# Patient Record
Sex: MALE | Race: OTHER | HISPANIC OR LATINO
[De-identification: names, ages, dates, MRNs, and addresses within clinical notes are randomized per-mention and may not be internally consistent; named-entity substitution may affect disease eponyms.]

---

## 2023-04-18 ENCOUNTER — APPOINTMENT (OUTPATIENT)
Dept: ORTHOPEDIC SURGERY | Facility: CLINIC | Age: 43
End: 2023-04-18

## 2023-04-18 PROBLEM — Z00.00 ENCOUNTER FOR PREVENTIVE HEALTH EXAMINATION: Status: ACTIVE | Noted: 2023-04-18

## 2023-04-19 ENCOUNTER — APPOINTMENT (OUTPATIENT)
Dept: ORTHOPEDIC SURGERY | Facility: CLINIC | Age: 43
End: 2023-04-19
Payer: OTHER MISCELLANEOUS

## 2023-04-19 VITALS — BODY MASS INDEX: 31.7 KG/M2 | HEIGHT: 69 IN | WEIGHT: 214 LBS

## 2023-04-19 DIAGNOSIS — S33.5XXA SPRAIN OF LIGAMENTS OF LUMBAR SPINE, INITIAL ENCOUNTER: ICD-10-CM

## 2023-04-19 DIAGNOSIS — M54.16 RADICULOPATHY, LUMBAR REGION: ICD-10-CM

## 2023-04-19 PROCEDURE — 72100 X-RAY EXAM L-S SPINE 2/3 VWS: CPT

## 2023-04-19 PROCEDURE — 72170 X-RAY EXAM OF PELVIS: CPT

## 2023-04-19 NOTE — PHYSICAL EXAM
[] : antalgic [de-identified] : He has 4/5 weakness left against dorsiflexion compared to the right [de-identified] : left leg radicular complaints

## 2023-04-19 NOTE — ASSESSMENT
[FreeTextEntry1] : start on medrol robaxin then diclofenac after medrol. \par start therapy and f/u one week with spine specialist Dr Mas. If radicular pain persist consider mri r/o hnp\par he would like to continue working, should try to take breaks and not do any lifting if possible

## 2023-04-19 NOTE — WORK
[Sprain/Strain] : sprain/strain [Was the competent medical cause of the injury] : was the competent medical cause of the injury [Are consistent with the injury] : are consistent with the injury [Consistent with my objective findings] : consistent with my objective findings [Mild Partial] : mild partial [Does not reveal pre-existing condition(s) that may affect treatment/prognosis] : does not reveal pre-existing condition(s) that may affect treatment/prognosis [Can return to work without limitations on ______] : can return to work without limitations on [unfilled] [Lifting] : lifting [Pulling/Pushing] : pulling/pushing [Unknown at this time] : : unknown at this time [Patient] : patient [No Rx restrictions] : No Rx restrictions. [I provided the services listed above] :  I provided the services listed above. [Medium Work:] : Medium Work: Exerting 20 to 50 pounds of force occasionally, and/or 10 to 25 pounds of force frequently, and/or greater than negligible up to 10 pounds of force constantly to move objects. Physical demand requirements are in excess of those for Light Work

## 2023-04-19 NOTE — HISTORY OF PRESENT ILLNESS
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 He states while at work on 4/14/23 he lifted up a heavy case and developed pain and limited range of motion in the lower back. He has been ambulating with a limp. He has noted pain posterior left leg.\par He has continued to work.  [FreeTextEntry5] : pt injured lower back 04/17/23 pt states he picked up a crate milk

## 2023-04-19 NOTE — IMAGING
[de-identified] : normal [Straightening consistent with spasm] : Straightening consistent with spasm

## 2023-04-28 ENCOUNTER — FORM ENCOUNTER (OUTPATIENT)
Age: 43
End: 2023-04-28

## 2023-04-28 ENCOUNTER — APPOINTMENT (OUTPATIENT)
Dept: ORTHOPEDIC SURGERY | Facility: CLINIC | Age: 43
End: 2023-04-28
Payer: OTHER MISCELLANEOUS

## 2023-04-28 VITALS — BODY MASS INDEX: 31.7 KG/M2 | HEIGHT: 69 IN | WEIGHT: 214 LBS

## 2023-04-28 DIAGNOSIS — M47.816 SPONDYLOSIS W/OUT MYELOPATHY OR RADICULOPATHY, LUMBAR REGION: ICD-10-CM

## 2023-04-28 PROCEDURE — 99204 OFFICE O/P NEW MOD 45 MIN: CPT

## 2023-04-28 NOTE — HISTORY OF PRESENT ILLNESS
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 Patient reports on 4/19/23, pain started after heavy lifting (crate of milk,3-40lbs) felt strain.\par \par  Was seen at [facility] on 4/19/23, had x-rays, Tx with Medrol, diclofenac, PT. \par \par Since injury, patient was on Diclofenac and completed a MDP after coming to [facility] on 4/19/23. Patient reports improvement with MDP. Patient has performed 1 session of PT since injury. \par \par No previous spine issues, no hx of spine surgery, No hx of MIRA\par No recent MRI \par No use of cane or brace \par \par PmHx: Asthma\par No DM, No CA\par \par Xray lumbar spine completed 4/19/23\par L spine - negative \par \par working full duty  [] : no [FreeTextEntry1] : Lower back  [FreeTextEntry3] : 4/17/2023 [FreeTextEntry5] : 44 yo M here for lower back eval. 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 He states while at work on 4/14/23 he lifted up a heavy case and developed pain and limited range of motion in the lower back. Was seen at [facility] on 4/19/23, had x-rays, Tx with Medrol, diclofenac, PT.  [FreeTextEntry6] : numbness [FreeTextEntry7] : both legs  [de-identified] : motion [de-identified] : 4/19/23 [de-identified] : Arturo Frank RPA [de-identified] : 4/26/2023 [de-identified] : Xrays at [facility] [de-identified] : dietary rehab home

## 2023-04-28 NOTE — PHYSICAL EXAM
[Flexion] : flexion [Extension] : extension [Bending to left] : bending to left [Bending to right] : bending to right [] : no SI joint tenderness

## 2023-04-28 NOTE — DISCUSSION/SUMMARY
[de-identified] : reviewed the case and the imaging \par indicated for MRi l spine \par cont with PT \par fu review the MRI \par working with pain

## 2023-04-29 ENCOUNTER — APPOINTMENT (OUTPATIENT)
Dept: MRI IMAGING | Facility: CLINIC | Age: 43
End: 2023-04-29
Payer: OTHER MISCELLANEOUS

## 2023-04-29 PROCEDURE — 72148 MRI LUMBAR SPINE W/O DYE: CPT

## 2023-05-19 ENCOUNTER — APPOINTMENT (OUTPATIENT)
Dept: ORTHOPEDIC SURGERY | Facility: CLINIC | Age: 43
End: 2023-05-19
Payer: OTHER MISCELLANEOUS

## 2023-05-19 DIAGNOSIS — M51.26 OTHER INTERVERTEBRAL DISC DISPLACEMENT, LUMBAR REGION: ICD-10-CM

## 2023-05-19 PROCEDURE — 99214 OFFICE O/P EST MOD 30 MIN: CPT

## 2023-05-19 NOTE — DISCUSSION/SUMMARY
[de-identified] : reviewed the MRi with him \par right sided L4-5 disc herniation\par PT \par working with it \par fu 4-6 weeks \par

## 2023-05-19 NOTE — HISTORY OF PRESENT ILLNESS
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 Patient reports on 4/19/23, pain started after heavy lifting (crate of milk,3-40lbs) felt strain.\par \par  Was seen at [facility] on 4/19/23, had x-rays, Tx with Medrol, diclofenac, PT. \par \par Since injury, patient was on Diclofenac and completed a MDP after coming to [facility] on 4/19/23. Patient reports improvement with MDP. Patient has performed 1 session of PT since injury. \par \par No previous spine issues, no hx of spine surgery, No hx of MIRA\par No recent MRI \par No use of cane or brace \par \par PmHx: Asthma\par No DM, No CA\par \par Xray lumbar spine completed 4/19/23\par L spine - negative \par \par working full duty \par \par 5/19/23: Here for fu - plan at last was "reviewed the case and the imaging \par indicated for MRi l spine \par cont with PT \par fu review the MRI \par working with pain" - overall the pain continues - working through it - has carcamo some PT which helps - the medication helped a bit - the leg feels better - lower back is painful \par \par MRi L spine - 1. Right foraminal herniation impinges the right exiting L4 nerve root at L4-L5 and there is disc bulging, bony \par ridging, facet arthrosis, and right greater than left foraminal narrowing.\par 2. Mild bilateral facet hypertrophy at L5-S1 and mild Schmorl's nodes at the thoracolumbar junction without \par acute fracture or malalignment.\par 3. Thin fatty filum terminale which appears nonaggressive. [] : no [FreeTextEntry1] : Lower back  [FreeTextEntry3] : 4/17/2023 [FreeTextEntry5] : ANN SANTANA is a 43 year old M here for a follow up for lumbar spine MRI results. Pt reports that he is doing better since the last visit.  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

## 2023-05-19 NOTE — PHYSICAL EXAM
[Flexion] : flexion [Extension] : extension [Bending to left] : bending to left [Bending to right] : bending to right [] : no diminished ROM in all planes

## 2023-05-19 NOTE — DATA REVIEWED
[MRI] : MRI [Lumbar Spine] : lumbar spine [I independently reviewed and interpreted images and report] : I independently reviewed and interpreted images and report [Report was reviewed and noted in the chart] : The report was reviewed and noted in the chart

## 2023-06-30 ENCOUNTER — APPOINTMENT (OUTPATIENT)
Dept: ORTHOPEDIC SURGERY | Facility: CLINIC | Age: 43
End: 2023-06-30

## 2023-08-18 ENCOUNTER — APPOINTMENT (OUTPATIENT)
Dept: PULMONOLOGY | Facility: CLINIC | Age: 43
End: 2023-08-18
Payer: COMMERCIAL

## 2023-08-18 ENCOUNTER — LABORATORY RESULT (OUTPATIENT)
Age: 43
End: 2023-08-18

## 2023-08-18 VITALS
RESPIRATION RATE: 15 BRPM | WEIGHT: 209 LBS | SYSTOLIC BLOOD PRESSURE: 117 MMHG | OXYGEN SATURATION: 99 % | HEART RATE: 81 BPM | DIASTOLIC BLOOD PRESSURE: 77 MMHG | HEIGHT: 69 IN | TEMPERATURE: 98 F | BODY MASS INDEX: 30.96 KG/M2

## 2023-08-18 PROCEDURE — 99205 OFFICE O/P NEW HI 60 MIN: CPT | Mod: 25

## 2023-08-18 PROCEDURE — 36415 COLL VENOUS BLD VENIPUNCTURE: CPT

## 2023-08-18 NOTE — ASSESSMENT
[FreeTextEntry1] : Mr. ANN SANTANA is a 43 year old man with longstanding history of asthma (multiple hospitalizations, no intubations, previously on Dupixent) is here for initial evaluation.   #Asthma -currently not well controlled.  History of frequent steroid use, wheezing on exam today. Previously on Dupixent, which per patient did not help -- For now, will treat with prolonged steroid taper, instructions provided to the patient -- Continue Breztri and albuterol nebs -- Obtain pulmonary function testing, CXR, allergy panel -- Obtain records from prior pulmonologist  All questions answered. Patient in agreement with plan.  Follow up in 2-3w  or sooner if needed.

## 2023-08-18 NOTE — HISTORY OF PRESENT ILLNESS
[Never] : never [TextBox_4] : Mr. ANN SANTANA is a 43 year old man with longstanding history of asthma (multiple hospitalizations, no intubations, previously on Dupixent) is here for initial evaluation.   Reports hx of asthma since childhood. Was hospitalized with post COVID/asthma in 2020, total 3 asthma related hospitalizations, no intubations. He has been maintained on Breztri and prn albuterol. He notes worsening control of his sx since COVID.  He requires frequent courses of prednisone, 5-6 courses since Jan. he always feels better after steroids, last course 2 weeks ago. Had bad cough and fever a month ago, tested positive for Flu A, treated with tamiflu. Ever since, continued to have mild dry cough, wheezing more than normal. Has been using Albuterol nebs every two hours. Has been having a lot of wheezing and SOB. Has been ongoing for about a month now.  Occ HA and dizziness.   Was previusly followed by Dr Waddell [phone].  Was on Dupixent last year for about 8 months without improvement in sx.   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

## 2023-08-18 NOTE — PHYSICAL EXAM
[No Acute Distress] : no acute distress [Normal Oropharynx] : normal oropharynx [Normal Appearance] : normal appearance [No Neck Mass] : no neck mass [Normal Rate/Rhythm] : normal rate/rhythm [Normal S1, S2] : normal s1, s2 [No Murmurs] : no murmurs [No Resp Distress] : no resp distress [No Abnormalities] : no abnormalities [Benign] : benign [Normal Gait] : normal gait [No Clubbing] : no clubbing [No Cyanosis] : no cyanosis [No Edema] : no edema [Normal Color/ Pigmentation] : normal color/ pigmentation [FROM] : FROM [No Focal Deficits] : no focal deficits [Oriented x3] : oriented x3 [Normal Affect] : normal affect [TextBox_68] : end expiratory wheezing, good air movement

## 2023-08-18 NOTE — CONSULT LETTER
[Dear  ___] : Dear  [unfilled], [Consult Letter:] : I had the pleasure of evaluating your patient, [unfilled]. [Please see my note below.] : Please see my note below. [Consult Closing:] : Thank you very much for allowing me to participate in the care of this patient.  If you have any questions, please do not hesitate to contact me. [FreeTextEntry3] : Sincerely,  Shalonda Garduno MD [facility] tel: [phone] fax: [phone]

## 2023-08-28 ENCOUNTER — APPOINTMENT (OUTPATIENT)
Dept: PULMONOLOGY | Facility: CLINIC | Age: 43
End: 2023-08-28
Payer: COMMERCIAL

## 2023-08-28 PROCEDURE — 94729 DIFFUSING CAPACITY: CPT

## 2023-08-28 PROCEDURE — 94727 GAS DIL/WSHOT DETER LNG VOL: CPT

## 2023-08-28 PROCEDURE — 94060 EVALUATION OF WHEEZING: CPT

## 2023-09-05 ENCOUNTER — APPOINTMENT (OUTPATIENT)
Dept: PULMONOLOGY | Facility: CLINIC | Age: 43
End: 2023-09-05
Payer: COMMERCIAL

## 2023-09-05 VITALS
WEIGHT: 209 LBS | BODY MASS INDEX: 30.96 KG/M2 | HEIGHT: 69 IN | SYSTOLIC BLOOD PRESSURE: 129 MMHG | DIASTOLIC BLOOD PRESSURE: 86 MMHG | HEART RATE: 89 BPM | OXYGEN SATURATION: 99 %

## 2023-09-05 LAB
A ALTERNATA IGE QN: <0.1 KUA/L
A FUMIGATUS IGE QN: <0.1 KUA/L
BERMUDA GRASS IGE QN: 0.35 KUA/L
BOXELDER IGE QN: 0.81 KUA/L
C HERBARUM IGE QN: <0.1 KUA/L
CALIF WALNUT IGE QN: <0.1 KUA/L
CAT DANDER IGE QN: <0.1 KUA/L
CMN PIGWEED IGE QN: <0.1 KUA/L
COMMON RAGWEED IGE QN: 0.55 KUA/L
COTTONWOOD IGE QN: <0.1 KUA/L
D FARINAE IGE QN: 11.6 KUA/L
D PTERONYSS IGE QN: 14.4 KUA/L
DEPRECATED A ALTERNATA IGE RAST QL: 0
DEPRECATED A FUMIGATUS IGE RAST QL: 0
DEPRECATED BERMUDA GRASS IGE RAST QL: 1
DEPRECATED BOXELDER IGE RAST QL: 2
DEPRECATED C HERBARUM IGE RAST QL: 0
DEPRECATED CAT DANDER IGE RAST QL: 0
DEPRECATED COMMON PIGWEED IGE RAST QL: 0
DEPRECATED COMMON RAGWEED IGE RAST QL: 1
DEPRECATED COTTONWOOD IGE RAST QL: 0
DEPRECATED D FARINAE IGE RAST QL: 3
DEPRECATED D PTERONYSS IGE RAST QL: 3
DEPRECATED DOG DANDER IGE RAST QL: 2
DEPRECATED GOOSEFOOT IGE RAST QL: 0
DEPRECATED LONDON PLANE IGE RAST QL: 0
DEPRECATED MOUSE URINE PROT IGE RAST QL: 2
DEPRECATED MUGWORT IGE RAST QL: 0
DEPRECATED P NOTATUM IGE RAST QL: 0
DEPRECATED RED CEDAR IGE RAST QL: NORMAL
DEPRECATED ROACH IGE RAST QL: 2
DEPRECATED SHEEP SORREL IGE RAST QL: 0
DEPRECATED SILVER BIRCH IGE RAST QL: 0
DEPRECATED TIMOTHY IGE RAST QL: 3
DEPRECATED WHITE ASH IGE RAST QL: 0
DEPRECATED WHITE OAK IGE RAST QL: 0
DOG DANDER IGE QN: 1.51 KUA/L
GOOSEFOOT IGE QN: <0.1 KUA/L
LONDON PLANE IGE QN: <0.1 KUA/L
MOUSE URINE PROT IGE QN: 0.82 KUA/L
MUGWORT IGE QN: <0.1 KUA/L
MULBERRY (T70) CLASS: 0
MULBERRY (T70) CONC: <0.1 KUA/L
P NOTATUM IGE QN: <0.1 KUA/L
RED CEDAR IGE QN: 0.28 KUA/L
ROACH IGE QN: 1.87 KUA/L
SHEEP SORREL IGE QN: <0.1 KUA/L
SILVER BIRCH IGE QN: <0.1 KUA/L
TIMOTHY IGE QN: 4.27 KUA/L
TOTAL IGE SMQN RAST: 288 KU/L
TREE ALLERG MIX1 IGE QL: 0
WHITE ASH IGE QN: <0.1 KUA/L
WHITE ELM IGE QN: 0
WHITE ELM IGE QN: <0.1 KUA/L
WHITE OAK IGE QN: <0.1 KUA/L

## 2023-09-05 PROCEDURE — 99214 OFFICE O/P EST MOD 30 MIN: CPT

## 2023-09-05 NOTE — PHYSICAL EXAM
[No Acute Distress] : no acute distress [Normal Oropharynx] : normal oropharynx [Normal Appearance] : normal appearance [No Neck Mass] : no neck mass [Normal Rate/Rhythm] : normal rate/rhythm [Normal S1, S2] : normal s1, s2 [No Murmurs] : no murmurs [No Resp Distress] : no resp distress [No Abnormalities] : no abnormalities [Benign] : benign [Normal Gait] : normal gait [No Clubbing] : no clubbing [No Cyanosis] : no cyanosis [No Edema] : no edema [FROM] : FROM [Normal Color/ Pigmentation] : normal color/ pigmentation [No Focal Deficits] : no focal deficits [Oriented x3] : oriented x3 [Normal Affect] : normal affect [TextBox_68] : Within movement, no wheezing

## 2023-09-05 NOTE — CONSULT LETTER
[Dear  ___] : Dear  [unfilled], [Consult Letter:] : I had the pleasure of evaluating your patient, [unfilled]. [Please see my note below.] : Please see my note below. [Consult Closing:] : Thank you very much for allowing me to participate in the care of this patient.  If you have any questions, please do not hesitate to contact me. [FreeTextEntry3] : Sincerely,  Shalonda Garduno MD [facility] tel: [phone] fax: [phone]

## 2023-09-05 NOTE — ASSESSMENT
[FreeTextEntry1] : Mr. ANN SANTANA is a 43 year old man with longstanding history of asthma (multiple hospitalizations, no intubations, previously on Dupixent) is here for initial evaluation.   #Asthma - not well controlled, s/p prolonged steroid taper August 2023.  Frequent albuterol use.  Pulmonary function testing with severe obstruction, moderate restriction, normal DLCO, positive bronchodilator response Previously on Dupixent, which per patient did not help Blood work with multiple allergies.  No evidence of eosinophilia (was done post steroids) -- Continue Breztri and albuterol prn -- Add Singulair.  Side effects discussed -- will likely need Tezspire -- CXR pending  -- Obtain records from prior pulmonologist  All questions answered. Patient in agreement with plan.  Follow up in 2-3w  or sooner if needed.

## 2023-09-05 NOTE — HISTORY OF PRESENT ILLNESS
[Never] : never [TextBox_4] : Mr. ANN SANTANA is a 43 year old man with longstanding history of asthma (multiple hospitalizations, no intubations, previously on Dupixent) is here for follow-up.  History Reports hx of asthma since childhood. Was hospitalized with post COVID/asthma in 2020, total 3 asthma related hospitalizations, no intubations. He has been maintained on Breztri and prn albuterol. He notes worsening control of his sx since COVID.  He requires frequent courses of prednisone, 5-6 courses since Jan. he always feels better after steroids, last course 2 weeks ago. had  Flu A June 2023, treated with tamiflu. Ever since, continued to have mild dry cough, wheezing more than normal. Was previusly followed by Dr Waddell [phone].  Was on Dupixent last year for about 8 months without improvement in sx.   Interval events: Completed prolonged steroid taper.  Feeling better.  Using albuterol 2-4 times a day.  Compliant with Breztri.  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

## 2023-10-03 ENCOUNTER — APPOINTMENT (OUTPATIENT)
Dept: PULMONOLOGY | Facility: CLINIC | Age: 43
End: 2023-10-03
Payer: COMMERCIAL

## 2023-10-03 VITALS
OXYGEN SATURATION: 96 % | WEIGHT: 210 LBS | BODY MASS INDEX: 31.1 KG/M2 | DIASTOLIC BLOOD PRESSURE: 85 MMHG | SYSTOLIC BLOOD PRESSURE: 133 MMHG | HEART RATE: 89 BPM | HEIGHT: 69 IN

## 2023-10-03 DIAGNOSIS — Z23 ENCOUNTER FOR IMMUNIZATION: ICD-10-CM

## 2023-10-03 PROCEDURE — 90686 IIV4 VACC NO PRSV 0.5 ML IM: CPT

## 2023-10-03 PROCEDURE — 90472 IMMUNIZATION ADMIN EACH ADD: CPT

## 2023-10-03 PROCEDURE — 99215 OFFICE O/P EST HI 40 MIN: CPT | Mod: 25

## 2023-10-03 PROCEDURE — G0009: CPT

## 2023-10-03 PROCEDURE — 90677 PCV20 VACCINE IM: CPT

## 2023-11-09 ENCOUNTER — APPOINTMENT (OUTPATIENT)
Dept: PULMONOLOGY | Facility: CLINIC | Age: 43
End: 2023-11-09
Payer: COMMERCIAL

## 2023-11-09 VITALS
OXYGEN SATURATION: 97 % | BODY MASS INDEX: 31.45 KG/M2 | HEART RATE: 82 BPM | DIASTOLIC BLOOD PRESSURE: 80 MMHG | SYSTOLIC BLOOD PRESSURE: 128 MMHG | WEIGHT: 213 LBS

## 2023-11-09 PROCEDURE — 99214 OFFICE O/P EST MOD 30 MIN: CPT

## 2023-11-09 RX ORDER — PREDNISONE 10 MG/1
10 TABLET ORAL
Qty: 50 | Refills: 0 | Status: DISCONTINUED | COMMUNITY
Start: 2023-08-18 | End: 2023-11-09

## 2023-11-09 RX ORDER — METHYLPREDNISOLONE 4 MG/1
4 TABLET ORAL
Qty: 1 | Refills: 0 | Status: DISCONTINUED | COMMUNITY
Start: 2023-04-19 | End: 2023-11-09

## 2023-11-17 LAB
A FLAVUS AB FLD QL: NEGATIVE
A FUMIGATUS AB FLD QL: NEGATIVE
A NIGER AB FLD QL: NEGATIVE
BASOPHILS # BLD AUTO: 0.04 K/UL
BASOPHILS NFR BLD AUTO: 0.6 %
EOSINOPHIL # BLD AUTO: 0.35 K/UL
EOSINOPHIL NFR BLD AUTO: 5 %
GALACTOMANNAN AG SERPL QL IA: 0.09 INDEX
HCT VFR BLD CALC: 49.1 %
HGB BLD-MCNC: 16.2 G/DL
IMM GRANULOCYTES NFR BLD AUTO: 0.6 %
LYMPHOCYTES # BLD AUTO: 2.81 K/UL
LYMPHOCYTES NFR BLD AUTO: 40.4 %
MAN DIFF?: NORMAL
MCHC RBC-ENTMCNC: 28.3 PG
MCHC RBC-ENTMCNC: 33 GM/DL
MCV RBC AUTO: 85.7 FL
MONOCYTES # BLD AUTO: 0.4 K/UL
MONOCYTES NFR BLD AUTO: 5.7 %
NEUTROPHILS # BLD AUTO: 3.32 K/UL
NEUTROPHILS NFR BLD AUTO: 47.7 %
PLATELET # BLD AUTO: 324 K/UL
RBC # BLD: 5.73 M/UL
RBC # FLD: 14.3 %
TOTAL IGE SMQN RAST: 253 KU/L
WBC # FLD AUTO: 6.96 K/UL

## 2023-12-07 ENCOUNTER — APPOINTMENT (OUTPATIENT)
Dept: PULMONOLOGY | Facility: CLINIC | Age: 43
End: 2023-12-07
Payer: COMMERCIAL

## 2023-12-07 PROCEDURE — 94727 GAS DIL/WSHOT DETER LNG VOL: CPT

## 2023-12-07 PROCEDURE — 94060 EVALUATION OF WHEEZING: CPT

## 2023-12-07 PROCEDURE — 94729 DIFFUSING CAPACITY: CPT

## 2023-12-21 ENCOUNTER — APPOINTMENT (OUTPATIENT)
Dept: PULMONOLOGY | Facility: CLINIC | Age: 43
End: 2023-12-21
Payer: COMMERCIAL

## 2023-12-21 VITALS
HEART RATE: 78 BPM | OXYGEN SATURATION: 98 % | WEIGHT: 212 LBS | DIASTOLIC BLOOD PRESSURE: 68 MMHG | BODY MASS INDEX: 31.4 KG/M2 | SYSTOLIC BLOOD PRESSURE: 128 MMHG | HEIGHT: 69 IN

## 2023-12-21 PROCEDURE — 36415 COLL VENOUS BLD VENIPUNCTURE: CPT

## 2023-12-21 PROCEDURE — 99215 OFFICE O/P EST HI 40 MIN: CPT | Mod: 25

## 2023-12-21 NOTE — HISTORY OF PRESENT ILLNESS
[Never] : never [TextBox_4] : Mr. ANN SANTANA is a 43 year old man with longstanding history of asthma (multiple hospitalizations, no intubations, previously on Dupixent) is here for follow-up.  History Reports hx of asthma since childhood. Was hospitalized with post COVID/asthma in 2020, total 3 asthma related hospitalizations, no intubations. He has been maintained on Breztri and prn albuterol. He notes worsening control of his sx since COVID.  He requires frequent courses of prednisone, 5-6 courses since Jan. he always feels better after steroids, last course 2 weeks ago. had  Flu A June 2023, treated with tamiflu. Ever since, continued to have mild dry cough, wheezing more than normal. Was previusly followed by Dr Waddell [phone].  Was on Dupixent last year for about 8 months without improvement in sx.   Interval events: Compliant with Breztri, Singulair, Levocetirizine Has good days and bad days.  On bad days use albuterol up to 8 times per day.  He is never fully symptom-free.  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

## 2023-12-21 NOTE — PHYSICAL EXAM
[No Acute Distress] : no acute distress [Normal Oropharynx] : normal oropharynx [Normal Appearance] : normal appearance [Normal Rate/Rhythm] : normal rate/rhythm [No Neck Mass] : no neck mass [Normal S1, S2] : normal s1, s2 [No Murmurs] : no murmurs [No Resp Distress] : no resp distress [No Abnormalities] : no abnormalities [Benign] : benign [Normal Gait] : normal gait [No Clubbing] : no clubbing [No Cyanosis] : no cyanosis [No Edema] : no edema [FROM] : FROM [Normal Color/ Pigmentation] : normal color/ pigmentation [No Focal Deficits] : no focal deficits [Oriented x3] : oriented x3 [Normal Affect] : normal affect [TextBox_68] : Diffuse wheezing throughout

## 2023-12-21 NOTE — CONSULT LETTER
[Dear  ___] : Dear  [unfilled], [Consult Letter:] : I had the pleasure of evaluating your patient, [unfilled]. [Please see my note below.] : Please see my note below. [Consult Closing:] : Thank you very much for allowing me to participate in the care of this patient.  If you have any questions, please do not hesitate to contact me. [FreeTextEntry3] : Sincerely,  Shalonda Garduno MD [facility] tel: [phone] fax: [phone]

## 2023-12-21 NOTE — ASSESSMENT
[FreeTextEntry1] : Mr. ANN SANTANA is a 43 year old man with longstanding history of asthma (multiple hospitalizations, no intubations, previously on Dupixent) is here for f/u.  #Asthma - not well controlled, s/p prolonged steroid taper August 2023. Frequent albuterol use.   Pulmonary function testing Dec 2023 moderately severe obstruction, moderate restriction, normal DLCO, positive bronchodilator response Previously on Dupixent x 6 mo which per patient did not help Blood work with multiple allergies. No evidence of eosinophilia (was done post steroids) Today with acute exacerbation again --Treat with a prolonged steroid taper + Zpack -- Discussed the Tezspire  Patient agreeable, paperwork signed -- Continue Breztri and albuterol prn -- c/w Singulair and levocetirizine for allergies --Repeat blood work including IgE, eosinophils today  All questions answered. Patient in agreement with plan. Follow up in3 w or sooner if needed.

## 2023-12-22 LAB
BASOPHILS # BLD AUTO: 0.04 K/UL
BASOPHILS NFR BLD AUTO: 0.6 %
EOSINOPHIL # BLD AUTO: 0.44 K/UL
EOSINOPHIL NFR BLD AUTO: 6.4 %
HCT VFR BLD CALC: 46.6 %
HGB BLD-MCNC: 15.2 G/DL
IMM GRANULOCYTES NFR BLD AUTO: 0.4 %
LYMPHOCYTES # BLD AUTO: 2.95 K/UL
LYMPHOCYTES NFR BLD AUTO: 43.2 %
MAN DIFF?: NORMAL
MCHC RBC-ENTMCNC: 27.8 PG
MCHC RBC-ENTMCNC: 32.6 GM/DL
MCV RBC AUTO: 85.2 FL
MONOCYTES # BLD AUTO: 0.38 K/UL
MONOCYTES NFR BLD AUTO: 5.6 %
NEUTROPHILS # BLD AUTO: 2.99 K/UL
NEUTROPHILS NFR BLD AUTO: 43.8 %
PLATELET # BLD AUTO: 314 K/UL
RBC # BLD: 5.47 M/UL
RBC # FLD: 14.1 %
WBC # FLD AUTO: 6.83 K/UL

## 2024-01-02 LAB — TOTAL IGE SMQN RAST: 272 KU/L

## 2024-01-18 ENCOUNTER — APPOINTMENT (OUTPATIENT)
Dept: PULMONOLOGY | Facility: CLINIC | Age: 44
End: 2024-01-18
Payer: COMMERCIAL

## 2024-01-18 VITALS
SYSTOLIC BLOOD PRESSURE: 122 MMHG | RESPIRATION RATE: 16 BRPM | WEIGHT: 218.13 LBS | OXYGEN SATURATION: 99 % | HEIGHT: 69 IN | HEART RATE: 77 BPM | TEMPERATURE: 97.7 F | DIASTOLIC BLOOD PRESSURE: 83 MMHG | BODY MASS INDEX: 32.31 KG/M2

## 2024-01-18 PROCEDURE — 99214 OFFICE O/P EST MOD 30 MIN: CPT

## 2024-01-18 RX ORDER — PREDNISONE 10 MG/1
10 TABLET ORAL
Qty: 50 | Refills: 0 | Status: DISCONTINUED | COMMUNITY
Start: 2023-12-21 | End: 2024-01-18

## 2024-01-18 RX ORDER — DICLOFENAC SODIUM 75 MG/1
75 TABLET, DELAYED RELEASE ORAL
Qty: 60 | Refills: 0 | Status: DISCONTINUED | COMMUNITY
Start: 2023-04-19 | End: 2024-01-18

## 2024-01-18 RX ORDER — AZITHROMYCIN 250 MG/1
250 TABLET, FILM COATED ORAL
Qty: 1 | Refills: 0 | Status: DISCONTINUED | COMMUNITY
Start: 2023-12-21 | End: 2024-01-18

## 2024-01-18 RX ORDER — METHOCARBAMOL 750 MG/1
750 TABLET, FILM COATED ORAL TWICE DAILY
Qty: 60 | Refills: 0 | Status: DISCONTINUED | COMMUNITY
Start: 2023-04-19 | End: 2024-01-18

## 2024-01-18 NOTE — ASSESSMENT
[FreeTextEntry1] : Mr. ANN SANTANA is a 43 year old man with longstanding history of asthma (multiple hospitalizations, no intubations, previously on Dupixent) is here for f/u.  #Asthma - not well controlled, s/p prolonged steroid taper August 2023, another course of steroids December 2023 frequent albuterol use.   Pulmonary function testing Dec 2023 moderately severe obstruction, moderate restriction, normal DLCO, positive bronchodilator response Previously on Dupixent x 6 mo which per patient did not help Blood work with multiple allergies) for eosinophilia --Application send plan: -- Continue Breztri and albuterol prn -- c/w Singulair and levocetirizine for allergies --Repeat blood work including IgE, eosinophils today  All questions answered. Patient in agreement with plan. Follow up in 2mo or sooner if needed.

## 2024-01-18 NOTE — PHYSICAL EXAM
[No Acute Distress] : no acute distress [Normal Oropharynx] : normal oropharynx [No Neck Mass] : no neck mass [Normal Appearance] : normal appearance [Normal Rate/Rhythm] : normal rate/rhythm [Normal S1, S2] : normal s1, s2 [No Murmurs] : no murmurs [No Resp Distress] : no resp distress [No Abnormalities] : no abnormalities [Benign] : benign [Normal Gait] : normal gait [No Cyanosis] : no cyanosis [No Clubbing] : no clubbing [No Edema] : no edema [FROM] : FROM [Normal Color/ Pigmentation] : normal color/ pigmentation [No Focal Deficits] : no focal deficits [Oriented x3] : oriented x3 [Normal Affect] : normal affect [TextBox_68] : Minimal wheeze otherwise clear lungs

## 2024-01-18 NOTE — HISTORY OF PRESENT ILLNESS
[Never] : never [TextBox_4] : Mr. ANN SANTANA is a 43 year old man with longstanding history of asthma (multiple hospitalizations, no intubations, previously on Dupixent) is here for follow-up.  History Reports hx of asthma since childhood. Was hospitalized with post COVID/asthma in 2020, total 3 asthma related hospitalizations, no intubations. He has been maintained on Breztri and prn albuterol. He notes worsening control of his sx since COVID.  He requires frequent courses of prednisone, 5-6 courses since Jan. he always feels better after steroids, last course 2 weeks ago. had  Flu A June 2023, treated with tamiflu. Ever since, continued to have mild dry cough, wheezing more than normal. Was previusly followed by Dr Waddell [phone].  Was on Dupixent last year for about 8 months without improvement in sx.   Interval events: Compliant with Breztri, Singulair, Levocetirizine Treated with a prolonged steroid taper in December.  Has good days and bad days.  Has been feeling well over the last week.   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

## 2024-01-18 NOTE — CONSULT LETTER
[Dear  ___] : Dear  [unfilled], [Consult Letter:] : I had the pleasure of evaluating your patient, [unfilled]. [Please see my note below.] : Please see my note below. [Consult Closing:] : Thank you very much for allowing me to participate in the care of this patient.  If you have any questions, please do not hesitate to contact me. [FreeTextEntry3] : Sincerely,  Shalonda Garduno MD [facility] tel: [phone] fax: [phone]

## 2024-03-05 ENCOUNTER — APPOINTMENT (OUTPATIENT)
Dept: PULMONOLOGY | Facility: CLINIC | Age: 44
End: 2024-03-05
Payer: COMMERCIAL

## 2024-03-05 VITALS
HEIGHT: 69 IN | HEART RATE: 79 BPM | WEIGHT: 218 LBS | OXYGEN SATURATION: 100 % | DIASTOLIC BLOOD PRESSURE: 89 MMHG | SYSTOLIC BLOOD PRESSURE: 131 MMHG | BODY MASS INDEX: 32.29 KG/M2

## 2024-03-05 PROCEDURE — 99214 OFFICE O/P EST MOD 30 MIN: CPT

## 2024-03-05 NOTE — PHYSICAL EXAM
[No Acute Distress] : no acute distress [Normal Appearance] : normal appearance [Normal Oropharynx] : normal oropharynx [Normal Rate/Rhythm] : normal rate/rhythm [No Neck Mass] : no neck mass [Normal S1, S2] : normal s1, s2 [No Murmurs] : no murmurs [No Resp Distress] : no resp distress [No Abnormalities] : no abnormalities [Benign] : benign [No Clubbing] : no clubbing [Normal Gait] : normal gait [No Edema] : no edema [No Cyanosis] : no cyanosis [Normal Color/ Pigmentation] : normal color/ pigmentation [FROM] : FROM [No Focal Deficits] : no focal deficits [Oriented x3] : oriented x3 [Normal Affect] : normal affect [TextBox_68] : Minimal wheeze otherwise clear lungs

## 2024-03-05 NOTE — HISTORY OF PRESENT ILLNESS
[Never] : never [TextBox_4] : Mr. ANN SANTANA is a 43 year old man with longstanding history of asthma (multiple hospitalizations, no intubations, previously on Dupixent) is here for follow-up.  History Reports hx of asthma since childhood. Was hospitalized with post COVID/asthma in 2020, total 3 asthma related hospitalizations, no intubations. He has been maintained on Breztri and prn albuterol. He notes worsening control of his sx since COVID.  He requires frequent courses of prednisone, 5-6 courses since Jan. he always feels better after steroids, last course 2 weeks ago. had  Flu A June 2023, treated with tamiflu. Ever since, continued to have mild dry cough, wheezing more than normal. Was previusly followed by Dr Waddell [phone].  Was on Dupixent last year for about 8 months without improvement in sx.   Interval events: Compliant with Breztri, Singulair, Levocetirizine Treated with a prolonged steroid taper in December.  Has good days and bad days.  Has been feeling well over the last week.   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

## 2024-03-05 NOTE — CONSULT LETTER
[Dear  ___] : Dear  [unfilled], [Consult Letter:] : I had the pleasure of evaluating your patient, [unfilled]. [Consult Closing:] : Thank you very much for allowing me to participate in the care of this patient.  If you have any questions, please do not hesitate to contact me. [Please see my note below.] : Please see my note below. [FreeTextEntry3] : Sincerely,  Shalonda Garduno MD [facility] tel: [phone] fax: [phone]

## 2024-03-05 NOTE — ASSESSMENT
[FreeTextEntry1] : Mr. ANN SANTANA is a 43 year old man with longstanding history of asthma (multiple hospitalizations, no intubations, previously on Dupixent) is here for f/u.  #Asthma - not well controlled, s/p prolonged steroid taper August 2023, another course of steroids December 2023 frequent albuterol use.   Pulmonary function testing Dec 2023 moderately severe obstruction, moderate restriction, normal DLCO, positive bronchodilator response Previously on Dupixent x 6 mo which per patient did not help Blood work with multiple allergies) for eosinophilia --first Nucala infection today -tolerated well.  Patient observed in office for an hour without any issues.  Discussed monitoring for side effects.  Otherwise continue injections at home every 4 weeks. -- Continue Breztri and albuterol prn -- c/w Singulair and levocetirizine for allergies  All questions answered. Patient in agreement with plan. Follow up in 6-8w or sooner if needed.

## 2024-04-05 ENCOUNTER — APPOINTMENT (OUTPATIENT)
Dept: PULMONOLOGY | Facility: CLINIC | Age: 44
End: 2024-04-05

## 2024-06-25 ENCOUNTER — APPOINTMENT (OUTPATIENT)
Dept: PULMONOLOGY | Facility: CLINIC | Age: 44
End: 2024-06-25
Payer: COMMERCIAL

## 2024-06-25 VITALS
TEMPERATURE: 97.9 F | BODY MASS INDEX: 31.63 KG/M2 | OXYGEN SATURATION: 97 % | WEIGHT: 213.56 LBS | HEIGHT: 69 IN | HEART RATE: 105 BPM | RESPIRATION RATE: 16 BRPM | DIASTOLIC BLOOD PRESSURE: 81 MMHG | SYSTOLIC BLOOD PRESSURE: 122 MMHG

## 2024-06-25 DIAGNOSIS — J45.909 UNSPECIFIED ASTHMA, UNCOMPLICATED: ICD-10-CM

## 2024-06-25 PROCEDURE — 99214 OFFICE O/P EST MOD 30 MIN: CPT

## 2024-06-25 RX ORDER — BUDESONIDE, GLYCOPYRROLATE, AND FORMOTEROL FUMARATE 160; 9; 4.8 UG/1; UG/1; UG/1
160-9-4.8 AEROSOL, METERED RESPIRATORY (INHALATION)
Qty: 3 | Refills: 1 | Status: ACTIVE | COMMUNITY
Start: 2023-09-05

## 2024-06-25 RX ORDER — LEVOCETIRIZINE DIHYDROCHLORIDE 5 MG/1
5 TABLET ORAL
Qty: 90 | Refills: 0 | Status: ACTIVE | COMMUNITY
Start: 2023-11-09 | End: 1900-01-01

## 2024-06-25 RX ORDER — ALBUTEROL SULFATE 90 UG/1
108 (90 BASE) INHALANT RESPIRATORY (INHALATION)
Qty: 3 | Refills: 2 | Status: ACTIVE | COMMUNITY
Start: 2023-11-09 | End: 1900-01-01

## 2024-06-25 RX ORDER — MONTELUKAST 10 MG/1
10 TABLET, FILM COATED ORAL
Qty: 90 | Refills: 1 | Status: ACTIVE | COMMUNITY
Start: 2023-09-05 | End: 1900-01-01

## 2024-06-25 RX ORDER — MEPOLIZUMAB 100 MG/ML
100 INJECTION, SOLUTION SUBCUTANEOUS
Qty: 1 | Refills: 12 | Status: ACTIVE | COMMUNITY
Start: 2024-01-18 | End: 1900-01-01

## 2024-09-26 ENCOUNTER — APPOINTMENT (OUTPATIENT)
Dept: PULMONOLOGY | Facility: CLINIC | Age: 44
End: 2024-09-26
Payer: COMMERCIAL

## 2024-09-26 VITALS
WEIGHT: 215 LBS | OXYGEN SATURATION: 97 % | DIASTOLIC BLOOD PRESSURE: 59 MMHG | SYSTOLIC BLOOD PRESSURE: 82 MMHG | HEART RATE: 88 BPM | BODY MASS INDEX: 31.84 KG/M2 | HEIGHT: 69 IN

## 2024-09-26 DIAGNOSIS — J45.909 UNSPECIFIED ASTHMA, UNCOMPLICATED: ICD-10-CM

## 2024-09-26 DIAGNOSIS — R06.02 SHORTNESS OF BREATH: ICD-10-CM

## 2024-09-26 DIAGNOSIS — Z23 ENCOUNTER FOR IMMUNIZATION: ICD-10-CM

## 2024-09-26 PROCEDURE — 99215 OFFICE O/P EST HI 40 MIN: CPT | Mod: 25

## 2024-09-26 PROCEDURE — G0008: CPT

## 2024-09-26 PROCEDURE — 94729 DIFFUSING CAPACITY: CPT

## 2024-09-26 PROCEDURE — 90686 IIV4 VACC NO PRSV 0.5 ML IM: CPT

## 2024-09-26 PROCEDURE — 94727 GAS DIL/WSHOT DETER LNG VOL: CPT

## 2024-09-26 PROCEDURE — 94060 EVALUATION OF WHEEZING: CPT

## 2024-09-26 NOTE — ASSESSMENT
[FreeTextEntry1] : Mr. ANN SANTANA is a 43 year old man with longstanding history of asthma (multiple hospitalizations, no intubations, previously on Dupixent) is here for f/u.  #Asthma - not well controlled, s/p prolonged steroid taper August 2023, another course of steroids December 2023 frequent albuterol use.   Blood work with multiple allergies) for eosinophilia PFTs with severe obstruction and positive BD response.  Previously on Dupixent x 6 mo which per patient did not help On Nucala since 3/2024.  w no improvmeent Application for Tezspire completed -- Continue Breztri and albuterol prn -- c/w Singulair and levocetirizine for allergies  All questions answered. Patient in agreement with plan. Follow up in 2-3mo or sooner if needed.

## 2024-09-26 NOTE — CONSULT LETTER
[Dear  ___] : Dear  [unfilled], [Consult Letter:] : I had the pleasure of evaluating your patient, [unfilled]. [Please see my note below.] : Please see my note below. [Consult Closing:] : Thank you very much for allowing me to participate in the care of this patient.  If you have any questions, please do not hesitate to contact me. [FreeTextEntry3] : Sincerely,  Shalonda Garduno MD [facility] tel: [phone] fax: [phone]

## 2024-09-26 NOTE — PHYSICAL EXAM
[No Acute Distress] : no acute distress [Normal Oropharynx] : normal oropharynx [Normal Appearance] : normal appearance [No Neck Mass] : no neck mass [Normal Rate/Rhythm] : normal rate/rhythm [Normal S1, S2] : normal s1, s2 [No Murmurs] : no murmurs [No Resp Distress] : no resp distress [No Abnormalities] : no abnormalities [Benign] : benign [Normal Gait] : normal gait [No Clubbing] : no clubbing [No Cyanosis] : no cyanosis [No Edema] : no edema [FROM] : FROM [Normal Color/ Pigmentation] : normal color/ pigmentation [No Focal Deficits] : no focal deficits [Oriented x3] : oriented x3 [Normal Affect] : normal affect [TextBox_68] : good air movement, wheezing

## 2024-09-26 NOTE — HISTORY OF PRESENT ILLNESS
[Never] : never [TextBox_4] : Mr. ANN SANTANA is a 43 year old man with longstanding history of asthma (multiple hospitalizations, no intubations, previously on Dupixent) is here for follow-up.  History Reports hx of asthma since childhood. Was hospitalized with post COVID/asthma in 2020, total 3 asthma related hospitalizations, no intubations. He has been maintained on Breztri and prn albuterol. He notes worsening control of his sx since COVID.  He requires frequent courses of prednisone, 5-6 courses since Jan. he always feels better after steroids, last course 2 weeks ago. had  Flu A June 2023, treated with tamiflu. Ever since, continued to have mild dry cough, wheezing more than normal. Was previusly followed by Dr Waddell [phone].  Was on Dupixent last year for about 8 months without improvement in sx.   Interval events: On Nucala since March 2024 Has not noticed improvement in sx Continues to have good and bad days. Wheezing daily, some days worse than others.  Uses Albuterol multiple times a week.    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

## 2024-10-07 RX ORDER — TEZEPELUMAB-EKKO 210 MG/1.9ML
210 INJECTION, SOLUTION SUBCUTANEOUS
Qty: 1 | Refills: 11 | Status: ACTIVE | COMMUNITY
Start: 2024-10-04 | End: 1900-01-01

## 2024-10-15 ENCOUNTER — TRANSCRIPTION ENCOUNTER (OUTPATIENT)
Age: 44
End: 2024-10-15

## 2024-10-15 ENCOUNTER — APPOINTMENT (OUTPATIENT)
Dept: CT IMAGING | Facility: CLINIC | Age: 44
End: 2024-10-15

## 2024-10-15 PROCEDURE — 71250 CT THORAX DX C-: CPT

## 2024-11-07 ENCOUNTER — APPOINTMENT (OUTPATIENT)
Dept: PULMONOLOGY | Facility: CLINIC | Age: 44
End: 2024-11-07
Payer: COMMERCIAL

## 2024-11-07 VITALS
WEIGHT: 217 LBS | SYSTOLIC BLOOD PRESSURE: 120 MMHG | OXYGEN SATURATION: 98 % | HEIGHT: 69 IN | BODY MASS INDEX: 32.14 KG/M2 | DIASTOLIC BLOOD PRESSURE: 80 MMHG | HEART RATE: 98 BPM

## 2024-11-07 PROCEDURE — 94010 BREATHING CAPACITY TEST: CPT

## 2024-11-07 PROCEDURE — 94729 DIFFUSING CAPACITY: CPT

## 2024-11-07 PROCEDURE — 94726 PLETHYSMOGRAPHY LUNG VOLUMES: CPT

## 2024-11-07 PROCEDURE — 99215 OFFICE O/P EST HI 40 MIN: CPT | Mod: 25

## 2024-11-07 PROCEDURE — 96372 THER/PROPH/DIAG INJ SC/IM: CPT

## 2024-11-07 NOTE — CONSULT LETTER
[Dear  ___] : Dear  [unfilled], [Consult Letter:] : I had the pleasure of evaluating your patient, [unfilled]. [Please see my note below.] : Please see my note below. [Consult Closing:] : Thank you very much for allowing me to participate in the care of this patient.  If you have any questions, please do not hesitate to contact me. [FreeTextEntry3] : Sincerely,  Codi García MD [facility] tel: [phone] fax: [phone]

## 2024-11-07 NOTE — HISTORY OF PRESENT ILLNESS
[Never] : never [TextBox_4] : Mr. GALDINO JEFFERY is a 43 year old man with longstanding history of asthma (multiple hospitalizations, no intubations, previously on Dupixent) is here for follow-up.  History Reports hx of asthma since childhood. Was hospitalized with post COVID/asthma in 2020, total 3 asthma related hospitalizations, no intubations. He has been maintained on Breztri and prn albuterol. He notes worsening control of his sx since COVID.  He requires frequent courses of prednisone, 5-6 courses since Jan. he always feels better after steroids had  Flu A June 2023, treated with tamiflu. Ever since, continued to have mild dry cough, wheezing more than normal. Was previusly followed by Dr Magdaleno [phone].  Was on Dupixent last year for about 8 months without improvement in sx.   Interval events: On Nucala since March 2024. Has not noticed improvement in sx Continues to have good and bad days. Wheezing daily, some days worse than others.  Uses Albuterol multiple times a week.      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

## 2024-11-07 NOTE — PHYSICAL EXAM
[No Acute Distress] : no acute distress [Normal Oropharynx] : normal oropharynx [Normal Appearance] : normal appearance [No Neck Mass] : no neck mass [Normal Rate/Rhythm] : normal rate/rhythm [Normal S1, S2] : normal s1, s2 [No Murmurs] : no murmurs [No Resp Distress] : no resp distress [No Abnormalities] : no abnormalities [Benign] : benign [Normal Gait] : normal gait [No Clubbing] : no clubbing [No Cyanosis] : no cyanosis [No Edema] : no edema [FROM] : FROM [Normal Color/ Pigmentation] : normal color/ pigmentation [No Focal Deficits] : no focal deficits [Oriented x3] : oriented x3 [Normal Affect] : normal affect [TextBox_68] : diffuse wheezing, improved after albuterol use

## 2024-11-13 PROBLEM — R91.8 LUNG NODULES: Status: ACTIVE | Noted: 2024-11-07

## 2024-11-13 LAB
A1AT PHENOTYP SERPL-IMP: NORMAL
A1AT SERPL-MCNC: 127 MG/DL

## 2024-11-14 ENCOUNTER — APPOINTMENT (OUTPATIENT)
Dept: THORACIC SURGERY | Facility: CLINIC | Age: 44
End: 2024-11-14
Payer: COMMERCIAL

## 2024-11-14 VITALS
WEIGHT: 217 LBS | SYSTOLIC BLOOD PRESSURE: 138 MMHG | HEART RATE: 97 BPM | RESPIRATION RATE: 17 BRPM | BODY MASS INDEX: 32.14 KG/M2 | DIASTOLIC BLOOD PRESSURE: 96 MMHG | OXYGEN SATURATION: 98 % | HEIGHT: 69 IN

## 2024-11-14 DIAGNOSIS — Z77.098 CONTACT WITH AND (SUSPECTED) EXPOSURE TO OTHER HAZARDOUS, CHIEFLY NONMEDICINAL, CHEMICALS: ICD-10-CM

## 2024-11-14 DIAGNOSIS — R91.8 OTHER NONSPECIFIC ABNORMAL FINDING OF LUNG FIELD: ICD-10-CM

## 2024-11-14 DIAGNOSIS — J45.909 UNSPECIFIED ASTHMA, UNCOMPLICATED: ICD-10-CM

## 2024-11-14 PROCEDURE — 99244 OFF/OP CNSLTJ NEW/EST MOD 40: CPT

## 2024-11-14 NOTE — DATA REVIEWED
[FreeTextEntry1] : I have independently reviewed the following:  CT Chest on 10/15/24 PFTs on 11/7/24

## 2024-11-14 NOTE — ASSESSMENT
[FreeTextEntry1] : Mr. GALDINO JEFFERY, 44 year old male, never smoker, w/ hx of asthma since childhood, but with worsening control of his symtpoms since COVID, who presented with abnormal CT findings. Referred by Dr. García.   I have reviewed the patient's medical records and diagnostic images at time of this office consultation and have made the following recommendation: 1. this was his very first CT scan, the 1 cm RUL nodule is partially calcified, I recommended a close surveillance, and patient to RTC in 3 months with repeat CT Chest w/o contrast.   I, Dr. SRIVASTAVA, MARVIN MCCARTHY, personally performed the evaluation and management (E/M) services for this established patient who presents today with (a) new problem(s)/exacerbation of (an) existing condition(s). That E/M includes conducting the examination, assessing all new/exacerbated conditions, and establishing a new plan of care. Today, my ACP, Vanessa Smith NP was here to observe my evaluation and management services for this new problem/exacerbated condition to be followed going forward.

## 2024-11-14 NOTE — CONSULT LETTER
[Dear  ___] : Dear  [unfilled], [Consult Letter:] : I had the pleasure of evaluating your patient, [unfilled]. [Please see my note below.] : Please see my note below. [Consult Closing:] : Thank you very much for allowing me to participate in the care of this patient.  If you have any questions, please do not hesitate to contact me. [Sincerely,] : Sincerely, [FreeTextEntry2] : Codi García MD (ref/pulm) [FreeTextEntry3] : Jian Domínguez MD, MPH System Director of Thoracic Surgery Director of Comprehensive Lung and Foregut [place] Professor Cardiovascular & Thoracic Surgery  [facility] [employer] at [facility]

## 2024-11-14 NOTE — PHYSICAL EXAM
[Fully active, able to carry on all pre-disease performance without restriction] : Status 0 - Fully active, able to carry on all pre-disease performance without restriction [General Appearance - Alert] : alert [General Appearance - In No Acute Distress] : in no acute distress [Sclera] : the sclera and conjunctiva were normal [PERRL With Normal Accommodation] : pupils were equal in size, round, and reactive to light [Extraocular Movements] : extraocular movements were intact [Outer Ear] : the ears and nose were normal in appearance [Oropharynx] : the oropharynx was normal [Auscultation Breath Sounds / Voice Sounds] : lungs were clear to auscultation bilaterally [Heart Rate And Rhythm] : heart rate was normal and rhythm regular [Heart Sounds] : normal S1 and S2 [Heart Sounds Gallop] : no gallops [Murmurs] : no murmurs [Heart Sounds Pericardial Friction Rub] : no pericardial rub [Examination Of The Chest] : the chest was normal in appearance [Chest Visual Inspection Thoracic Asymmetry] : no chest asymmetry [Diminished Respiratory Excursion] : normal chest expansion [2+] : left 2+ [Bowel Sounds] : normal bowel sounds [Abdomen Soft] : soft [Abdomen Tenderness] : non-tender [Abdomen Mass (___ Cm)] : no abdominal mass palpated [Cervical Lymph Nodes Enlarged Posterior Bilaterally] : posterior cervical [Cervical Lymph Nodes Enlarged Anterior Bilaterally] : anterior cervical [Supraclavicular Lymph Nodes Enlarged Bilaterally] : supraclavicular [No CVA Tenderness] : no ~M costovertebral angle tenderness [No Spinal Tenderness] : no spinal tenderness [Abnormal Walk] : normal gait [Nail Clubbing] : no clubbing  or cyanosis of the fingernails [Musculoskeletal - Swelling] : no joint swelling seen [Motor Tone] : muscle strength and tone were normal [Skin Color & Pigmentation] : normal skin color and pigmentation [Skin Turgor] : normal skin turgor [] : no rash

## 2024-11-14 NOTE — CONSULT LETTER
[Dear  ___] : Dear  [unfilled], [Consult Letter:] : I had the pleasure of evaluating your patient, [unfilled]. [Please see my note below.] : Please see my note below. [Consult Closing:] : Thank you very much for allowing me to participate in the care of this patient.  If you have any questions, please do not hesitate to contact me. [Sincerely,] : Sincerely, [FreeTextEntry2] : Codi García MD (ref/pulm) [FreeTextEntry3] : Jian Domínguez MD, MPH System Director of Thoracic Surgery Director of Comprehensive Lung and Foregut [place] Professor Cardiovascular & Thoracic Surgery  [facility] [employer] at [facility]

## 2024-11-14 NOTE — HISTORY OF PRESENT ILLNESS
[FreeTextEntry1] : Mr. GALDINO JEFFERY, 44 year old male, never smoker, w/ hx of asthma since childhood, but with worsening control of his symtpoms since COVID, who presented with abnormal CT findings. Referred by Dr. García.   CT Chest on 10/15/24: - Right middle lobe 5 mm nodule (3 1, 81).  - Right upper lobe partially peripherally calcified 1 cm nodule (301, 53).  PFTs on 11/7/24: FVC 50%, FEV1 46%, DLCO 67%  Pt presents today for CT Sx consultation.

## 2024-12-05 ENCOUNTER — APPOINTMENT (OUTPATIENT)
Dept: PULMONOLOGY | Facility: CLINIC | Age: 44
End: 2024-12-05
Payer: COMMERCIAL

## 2024-12-05 VITALS
OXYGEN SATURATION: 100 % | SYSTOLIC BLOOD PRESSURE: 126 MMHG | BODY MASS INDEX: 31.55 KG/M2 | HEART RATE: 80 BPM | HEIGHT: 69 IN | DIASTOLIC BLOOD PRESSURE: 85 MMHG | WEIGHT: 213 LBS

## 2024-12-05 PROCEDURE — 99214 OFFICE O/P EST MOD 30 MIN: CPT

## 2024-12-05 NOTE — HISTORY OF PRESENT ILLNESS
[Never] : never [TextBox_4] : Mr. GALDINO JEFFERY is a 43 year old man with longstanding history of asthma (multiple hospitalizations, no intubations, previously on Dupixent) is here for follow-up.  History Reports hx of asthma since childhood. Was hospitalized with post COVID/asthma in 2020, total 3 asthma related hospitalizations, no intubations. He has been maintained on Breztri and prn albuterol. He notes worsening control of his sx since COVID.  He requires frequent courses of prednisone, 5-6 courses since Jan. he always feels better after steroids had  Flu A June 2023, treated with tamiflu. Ever since, continued to have mild dry cough, wheezing more than normal. Was previusly followed by Dr Magdaleno [phone].  Was on Dupixent last year for about 8 months without improvement in sx.   Interval events: On Nucala since March 2024. Has not noticed improvement in sx. Started Tezspire Nov 2024.  Continues to have good and bad days. Wheezing daily, some days worse than others.  Uses Albuterol multiple times a week.      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

## 2024-12-05 NOTE — CONSULT LETTER
[Dear  ___] : Dear  [unfilled], [Consult Letter:] : I had the pleasure of evaluating your patient, [unfilled]. [Please see my note below.] : Please see my note below. [Consult Closing:] : Thank you very much for allowing me to participate in the care of this patient.  If you have any questions, please do not hesitate to contact me. [FreeTextEntry3] : Sincerely,  Codi García MD [facility] tel: [phone] fax: [phone]

## 2024-12-05 NOTE — ASSESSMENT
[FreeTextEntry1] : Mr. GALDINO JEFFERY is a 43 year old man with longstanding history of asthma (multiple hospitalizations, no intubations, previously on Dupixent) is here for f/u.  #Focal Air trapping - CT chest from Sept 2024 reviewed and discussed with patient.  There is area of focal air trapping in RUL. 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  PFTs (PLETH) with e/o air trapping, large RV (>200%), decreased DLCO.  A1AT levels normal. Patient is a never smoker, no significant exposures.  -- I would consider bronchoscopy and possible EBV vs surgical resection of affected lobe (given 1cm nodule, resection may be a better option)   #Lung nodules -- 5mm RML nodule -- 1cm partially calcified RUL nodule -- plan to repeat imaging  #Asthma - Reports dx in childhood, required several hospitalizaitons in the past, frequent steroid use. Sot well controlled, s/p prolonged steroid taper August 2023, another course of steroids December 2023 frequent albuterol use.   Blood work with multiple allergies abd eosinophilia PFTs with severe obstruction and positive BD response.  Previously on Dupixent x 6 mo which per patient did not help On Nucala since 3/2024.  w no improvmeent Tezspire - fist dose Nov 2024.    -- Continue Breztri and albuterol prn -- c/w Singulair and levocetirizine for allergies  All questions answered. Patient in agreement with plan. Follow up in 2-3mo or sooner if needed.

## 2025-02-04 ENCOUNTER — APPOINTMENT (OUTPATIENT)
Dept: CT IMAGING | Facility: CLINIC | Age: 45
End: 2025-02-04
Payer: COMMERCIAL

## 2025-02-04 PROCEDURE — 71250 CT THORAX DX C-: CPT

## 2025-02-04 NOTE — CONSULT LETTER
[Dear  ___] : Dear  [unfilled], [Consult Letter:] : I had the pleasure of evaluating your patient, [unfilled]. [Please see my note below.] : Please see my note below. [Consult Closing:] : Thank you very much for allowing me to participate in the care of this patient.  If you have any questions, please do not hesitate to contact me. [Sincerely,] : Sincerely, [FreeTextEntry3] : Jian Domínguez MD, MPH System Director of Thoracic Surgery Director of Comprehensive Lung and Foregut [place] Professor Cardiovascular & Thoracic Surgery  [facility] [employer] [facility] at [facility] [FreeTextEntry2] : Codi García MD (ref/pulm)

## 2025-02-04 NOTE — ASSESSMENT
[FreeTextEntry1] : Mr. GALDINO JEFFERY, 45 year old male, never smoker, w/ hx of asthma since childhood, but with worsening control of his symtpoms since COVID, who presented with abnormal CT findings. Referred by Dr. García.  Pt presents today for 3 mo follow up  I have reviewed the patient's medical records and diagnostic images at time of this office consultation and have made the following recommendation: 1.

## 2025-02-04 NOTE — HISTORY OF PRESENT ILLNESS
[FreeTextEntry1] : Mr. GALDINO JEFFERY, 45 year old male, never smoker, w/ hx of asthma since childhood, but with worsening control of his symtpoms since COVID, who presented with abnormal CT findings. Referred by Dr. García.  CT Chest on 10/15/24: - Right middle lobe 5 mm nodule (3 1, 81). - Right upper lobe partially peripherally calcified 1 cm nodule (301, 53).  PFTs on 11/7/24: FVC 50%, FEV1 46%, DLCO 67%  11/14/24: this was his very first CT scan, the 1 cm RUL nodule is partially calcified, I recommended a close surveillance, and patient to RTC in 3 months with repeat CT Chest w/o contrast.  CT Chest 02/04/25 NW: -  Pt presents today for 3 mo follow up

## 2025-02-13 ENCOUNTER — NON-APPOINTMENT (OUTPATIENT)
Age: 45
End: 2025-02-13

## 2025-02-13 ENCOUNTER — APPOINTMENT (OUTPATIENT)
Dept: THORACIC SURGERY | Facility: CLINIC | Age: 45
End: 2025-02-13
Payer: COMMERCIAL

## 2025-02-13 VITALS
RESPIRATION RATE: 16 BRPM | WEIGHT: 215 LBS | HEIGHT: 69 IN | SYSTOLIC BLOOD PRESSURE: 139 MMHG | OXYGEN SATURATION: 98 % | DIASTOLIC BLOOD PRESSURE: 88 MMHG | BODY MASS INDEX: 31.84 KG/M2 | HEART RATE: 85 BPM

## 2025-02-13 DIAGNOSIS — R91.8 OTHER NONSPECIFIC ABNORMAL FINDING OF LUNG FIELD: ICD-10-CM

## 2025-02-13 PROCEDURE — 99214 OFFICE O/P EST MOD 30 MIN: CPT

## 2025-02-13 NOTE — CONSULT LETTER
[FreeTextEntry2] : Codi García MD (ref/pulm) [FreeTextEntry3] : Jian Domínguez MD, MPH System Director of Thoracic Surgery Director of Comprehensive Lung and Foregut [place] Professor Cardiovascular & Thoracic Surgery  [facility] [employer] at [facility]

## 2025-02-13 NOTE — CONSULT LETTER
[FreeTextEntry2] : Codi García MD (ref/pulm) [FreeTextEntry3] : Jian Domínguez MD, MPH System Director of Thoracic Surgery Director of Comprehensive Lung and Foregut [place] Professor Cardiovascular & Thoracic Surgery  [facility] [employer] at [facility] [place]

## 2025-02-13 NOTE — HISTORY OF PRESENT ILLNESS
[FreeTextEntry1] : Mr. GALDINO JEFFERY, 45 year old male, never smoker, w/ hx of asthma since childhood, pectus excavatum, but with worsening control of his symptoms since COVID, who presented with abnormal CT findings. Referred by Dr. García.  CT Chest on 10/15/24: - Right middle lobe 5 mm nodule (3 1, 81). - Right upper lobe partially peripherally calcified 1 cm nodule (301, 53).  PFTs on 11/7/24: FVC 50%, FEV1 46%, DLCO 67%  11/14/24: this was his very first CT scan, the 1 cm RUL nodule is partially calcified, I recommended a close surveillance, and patient to RTC in 3 months with repeat CT Chest w/o contrast.  CT Chest 02/04/25 NW: -Again noted the anterior/apical segments of right upper lobe and medial segment of right middle lobe are hyperlucent with diminished vascularity and emphysematous changes.  - Foci of bronchiectasis within these affected segments. Stable partially calcified right upper lobe nodule.  - Stable 5 mm right middle lobe nodule.  - Pectus excavatum deformity  Pt presents today for 3 mo follow up. Patient denies cough, SOB, pain or difficulty in breathing.

## 2025-02-13 NOTE — ASSESSMENT
[FreeTextEntry1] : Mr. GALDINO JEFFERY, 45 year old male, never smoker, w/ hx of asthma since childhood, but with worsening control of his symtpoms since COVID, who presented with abnormal CT findings. Referred by Dr. García.  Pt presents today for 3 mo follow up  I have reviewed the patient's medical records and diagnostic images at time of this office consultation and have made the following recommendation: 1. I personally reviewed CT images with the patient today and images demonstrated stable scan and no evidence of concerning lesion or increasing in size of RUL nodule. I recommended return to clinic in 6 months with CT Chest w/ no contrast.  I, MARVIN Romero, personally performed the evaluation and management (E/M) services for this established patient who presents today with (a) new problem(s)/exacerbation of (an) existing condition(s).  That E/M includes conducting the examination, assessing all new/exacerbated conditions, and establishing a new plan of care.  Today, my ACP, SUNNY Faust was here to observe my evaluation and management services for this new problem/exacerbated condition to be followed going forward.

## 2025-02-13 NOTE — PHYSICAL EXAM
[] : no respiratory distress [Heart Rate And Rhythm] : heart rate was normal and rhythm regular [Heart Sounds] : normal S1 and S2 [Heart Sounds Gallop] : no gallops [Murmurs] : no murmurs [Heart Sounds Pericardial Friction Rub] : no pericardial rub [Chest Visual Inspection Thoracic Asymmetry] : no chest asymmetry [Diminished Respiratory Excursion] : normal chest expansion [FreeTextEntry1] : Pectus excavatum deformity.

## 2025-03-04 ENCOUNTER — APPOINTMENT (OUTPATIENT)
Dept: PULMONOLOGY | Facility: CLINIC | Age: 45
End: 2025-03-04
Payer: COMMERCIAL

## 2025-03-04 VITALS
WEIGHT: 223 LBS | HEIGHT: 69 IN | DIASTOLIC BLOOD PRESSURE: 85 MMHG | SYSTOLIC BLOOD PRESSURE: 146 MMHG | HEART RATE: 83 BPM | OXYGEN SATURATION: 97 % | BODY MASS INDEX: 33.03 KG/M2

## 2025-03-04 DIAGNOSIS — J45.909 UNSPECIFIED ASTHMA, UNCOMPLICATED: ICD-10-CM

## 2025-03-04 PROCEDURE — 94618 PULMONARY STRESS TESTING: CPT

## 2025-03-04 PROCEDURE — 99214 OFFICE O/P EST MOD 30 MIN: CPT | Mod: 25

## 2025-03-04 RX ORDER — ALBUTEROL SULFATE 2.5 MG/3ML
(2.5 MG/3ML) SOLUTION RESPIRATORY (INHALATION)
Qty: 1 | Refills: 2 | Status: ACTIVE | COMMUNITY
Start: 2025-03-04 | End: 1900-01-01

## 2025-03-05 NOTE — HISTORY OF PRESENT ILLNESS
[Never] : never [TextBox_4] : Mr. GALDINO JEFFERY is a 43 year old man with longstanding history of asthma (multiple hospitalizations, no intubations, previously on Dupixent) is here for follow-up.  History Reports hx of asthma since childhood. Was hospitalized with post COVID/asthma in 2020, total 3 asthma related hospitalizations, no intubations. He has been maintained on Breztri and prn albuterol. He notes worsening control of his sx since COVID.  He requires frequent courses of prednisone, 5-6 courses since Jan. he always feels better after steroids had  Flu A June 2023, treated with tamiflu. Ever since, continued to have mild dry cough, wheezing more than normal. Was previusly followed by Dr Magdaleno [phone].  Was on Dupixent last year for about 8 months without improvement in sx.   Interval events: On Tezspire Nov 2024.  Continues to have good and bad days. Wheezing daily, some days worse than others.  Uses Albuterol multiple times a week.   Evaluated by Dr Domínguez.    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

## 2025-03-05 NOTE — CONSULT LETTER
[Dear  ___] : Dear  [unfilled], [Consult Letter:] : I had the pleasure of evaluating your patient, [unfilled]. [Please see my note below.] : Please see my note below. [Consult Closing:] : Thank you very much for allowing me to participate in the care of this patient.  If you have any questions, please do not hesitate to contact me. [FreeTextEntry3] : Sincerely,  Codi García MD [facility] tel: [phone] fax: [phone]

## 2025-03-05 NOTE — ASSESSMENT
[FreeTextEntry1] : Mr. GALDINO JEFFERY is a 43 year old man with longstanding history of asthma (multiple hospitalizations, no intubations, previously on Dupixent) is here for f/u.  #Focal Air trapping - CT chest from Sept 2024 reviewed and discussed with patient.  There is area of focal air trapping in RUL. 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  PFTs (PLETH) with e/o air trapping, large RV (>200%), decreased DLCO.  A1AT levels normal. Patient is a never smoker, no significant exposures.  -- discussed with Dr Domínguez - would like patient to be evaluated for surgical resection of affected RUL lobe (given 1cm nodule, resection may be a better option than EBV)   #Lung nodules CT Chest 02/04/25 NW: -Again noted the anterior/apical segments of right upper lobe and medial segment of right middle lobe are hyperlucent with diminished vascularity and emphysematous changes. - Foci of bronchiectasis within these affected segments. Stable partially calcified right upper lobe nodule (1cm) - Stable 5 mm right middle lobe nodule. - Pectus excavatum deformity  #Asthma - Reports dx in childhood, required several hospitalizaitons in the past, frequent steroid use. Sot well controlled, s/p prolonged steroid taper August 2023, another course of steroids December 2023 frequent albuterol use.   Blood work with multiple allergies abd eosinophilia PFTs with severe obstruction and positive BD response.  Previously on Dupixent x 6 mo which per patient did not help On Nucala since 3/2024.  w no improvmeent Tezspire - fist dose Nov 2024.    -- Continue Breztri and albuterol prn -- c/w Singulair and levocetirizine for allergies  All questions answered. Patient in agreement with plan. Follow up in 2-3mo or sooner if needed.

## 2025-03-05 NOTE — PHYSICAL EXAM
[No Acute Distress] : no acute distress [Normal Oropharynx] : normal oropharynx [Normal Appearance] : normal appearance [No Neck Mass] : no neck mass [Normal Rate/Rhythm] : normal rate/rhythm [Normal S1, S2] : normal s1, s2 [No Murmurs] : no murmurs [No Resp Distress] : no resp distress [No Abnormalities] : no abnormalities [Benign] : benign [Normal Gait] : normal gait [No Clubbing] : no clubbing [No Cyanosis] : no cyanosis [No Edema] : no edema [FROM] : FROM [Normal Color/ Pigmentation] : normal color/ pigmentation [No Focal Deficits] : no focal deficits [Oriented x3] : oriented x3 [Normal Affect] : normal affect [TextBox_68] : diffuse wheezing

## 2025-03-05 NOTE — CONSULT LETTER
[Dear  ___] : Dear  [unfilled], [Consult Letter:] : I had the pleasure of evaluating your patient, [unfilled]. [Please see my note below.] : Please see my note below. [Consult Closing:] : Thank you very much for allowing me to participate in the care of this patient.  If you have any questions, please do not hesitate to contact me. [FreeTextEntry3] : Sincerely,  Codi García MD [facility] tel: [phone] fax: [phone]

## 2025-03-05 NOTE — HISTORY OF PRESENT ILLNESS
[Never] : never [TextBox_4] : Mr. GALDINO JEFFERY is a 43 year old man with longstanding history of asthma (multiple hospitalizations, no intubations, previously on Dupixent) is here for follow-up.  History Reports hx of asthma since childhood. Was hospitalized with post COVID/asthma in 2020, total 3 asthma related hospitalizations, no intubations. He has been maintained on Breztri and prn albuterol. He notes worsening control of his sx since COVID.  He requires frequent courses of prednisone, 5-6 courses since Jan. he always feels better after steroids had  Flu A June 2023, treated with tamiflu. Ever since, continued to have mild dry cough, wheezing more than normal. Was previusly followed by Dr Magdaleno [phone].  Was on Dupixent last year for about 8 months without improvement in sx.   Interval events: On Tezspire Nov 2024.  Continues to have good and bad days. Wheezing daily, some days worse than others.  Uses Albuterol multiple times a week.   Evaluated by Dr Domínguez.    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

## 2025-04-10 ENCOUNTER — NON-APPOINTMENT (OUTPATIENT)
Age: 45
End: 2025-04-10

## 2025-04-10 DIAGNOSIS — R91.8 OTHER NONSPECIFIC ABNORMAL FINDING OF LUNG FIELD: ICD-10-CM

## 2025-04-24 ENCOUNTER — APPOINTMENT (OUTPATIENT)
Dept: THORACIC SURGERY | Facility: CLINIC | Age: 45
End: 2025-04-24
Payer: COMMERCIAL

## 2025-04-24 VITALS
RESPIRATION RATE: 17 BRPM | BODY MASS INDEX: 31.99 KG/M2 | HEIGHT: 69 IN | OXYGEN SATURATION: 98 % | SYSTOLIC BLOOD PRESSURE: 160 MMHG | WEIGHT: 216 LBS | HEART RATE: 84 BPM | DIASTOLIC BLOOD PRESSURE: 85 MMHG

## 2025-04-24 DIAGNOSIS — J47.9 BRONCHIECTASIS, UNCOMPLICATED: ICD-10-CM

## 2025-04-24 DIAGNOSIS — R91.8 OTHER NONSPECIFIC ABNORMAL FINDING OF LUNG FIELD: ICD-10-CM

## 2025-04-24 DIAGNOSIS — J43.9 EMPHYSEMA, UNSPECIFIED: ICD-10-CM

## 2025-04-24 PROCEDURE — 99214 OFFICE O/P EST MOD 30 MIN: CPT

## 2025-04-25 PROBLEM — J43.9 EMPHYSEMA LUNG: Status: ACTIVE | Noted: 2025-04-25

## 2025-04-25 PROBLEM — J47.9 BRONCHIECTASIS: Status: ACTIVE | Noted: 2025-04-25

## 2025-04-25 NOTE — CONSULT LETTER
[FreeTextEntry2] : Codi García MD (ref/pulm) [FreeTextEntry3] : Jian Domínguez MD, MPH System Director of Thoracic Surgery Director of Comprehensive Lung and Foregut [place] Professor Cardiovascular & Thoracic Surgery  [facility] [employer] [facility] at [facility]

## 2025-04-25 NOTE — CONSULT LETTER
[FreeTextEntry2] : Codi García MD (ref/pulm) [FreeTextEntry3] : Jian Domínguez MD, MPH System Director of Thoracic Surgery Director of Comprehensive Lung and Foregut [place] Professor Cardiovascular & Thoracic Surgery  [facility] [employer] [facility] at [facility]

## 2025-04-25 NOTE — CONSULT LETTER
[FreeTextEntry2] : Codi García MD (ref/pulm) [FreeTextEntry3] : Jian Domínguez MD, MPH System Director of Thoracic Surgery Director of Comprehensive Lung and Foregut [place] Professor Cardiovascular & Thoracic Surgery  [facility] [employer] [facility] at [facility]

## 2025-04-25 NOTE — CONSULT LETTER
[FreeTextEntry2] : Codi García MD (ref/pulm) [FreeTextEntry3] : Jian Domínguez MD, MPH System Director of Thoracic Surgery Director of Comprehensive Lung and Foregut [place] Professor Cardiovascular & Thoracic Surgery  [facility] [employer] [facility] at [facility]

## 2025-04-25 NOTE — ASSESSMENT
[FreeTextEntry1] : Mr. GALDINO JEFFERY, 45 year old male, never smoker, w/ hx of asthma since childhood, but with worsening control of his symtpoms since COVID, who presented with abnormal CT findings. Referred by Dr. García.  CT Chest 02/04/25 NW: -Again noted the anterior/apical segments of right upper lobe and medial segment of right middle lobe are hyperlucent with diminished vascularity and emphysematous changes.  - Foci of bronchiectasis within these affected segments. Stable partially calcified right upper lobe nodule.  - Stable 5 mm right middle lobe nodule.  - Pectus excavatum deformity  3/4/25: Per Dr. García, would like patient to be evaluated for surgical resection of affected RUL lobe (given 1cm nodule, resection may be a better option than EBV)  Pt presents today for follow up to discuss about surgery.  I have reviewed the patient's medical records and diagnostic images at time of this office consultation and have made the following recommendation: 1.Imaging reviewed. Emphysematous changes to right upper lobe; Patient symptomatic with increased shortness of breath upon exertion. Discussed Robotic Assisted, Right VATS, Right Upper Lobectomy for symptom improvement. Risks, benefits and alternatives explained to patient; All questions answered and patient agrees to proceed with surgery. Will book for June 16, 2025.  2. Quantitative VQ scan and medical clearance required prior to procedure. Patient is agreeable.   Recommendations reviewed with patient during this office visit, and all questions answered; Patient instructed on the importance of follow up and verbalizes understanding.  I, MARVIN Romero, personally performed the evaluation and management (E/M) services for this new patient. That E/M includes conducting the initial examination, assessing all conditions, and establishing the plan of care. Today, My ACP, Dyana Durand, was here to observe my evaluation and management services for this patient to be followed going forward.

## 2025-04-25 NOTE — HISTORY OF PRESENT ILLNESS
[FreeTextEntry1] : Mr. GALDINO JEFFERY, 45 year old male, never smoker, w/ hx of asthma since childhood, pectus excavatum, but with worsening control of his symptoms since COVID, who presented with abnormal CT findings. Referred by Dr. aGrcía.  CT Chest on 10/15/24: - Right middle lobe 5 mm nodule (3 1, 81). - Right upper lobe partially peripherally calcified 1 cm nodule (301, 53).  PFTs on 11/7/24: FVC 50%, FEV1 46%, DLCO 67%  11/14/24: this was his very first CT scan, the 1 cm RUL nodule is partially calcified, I recommended a close surveillance, and patient to RTC in 3 months with repeat CT Chest w/o contrast.  CT Chest 02/04/25 NW: -Again noted the anterior/apical segments of right upper lobe and medial segment of right middle lobe are hyperlucent with diminished vascularity and emphysematous changes.  - Foci of bronchiectasis within these affected segments. Stable partially calcified right upper lobe nodule.  - Stable 5 mm right middle lobe nodule.  - Pectus excavatum deformity  3/4/25: Per Dr. García, would like patient to be evaluated for surgical resection of affected RUL lobe (given 1cm nodule, resection may be a better option than EBV)  Pt presents today for follow up to discuss surgery. + Shortness of breath upon exertion. Today, patient denies worsening SOB, chest pain, cough, hemoptysis, fever, chills, night sweats, lightheadedness or dizziness.

## 2025-04-25 NOTE — PHYSICAL EXAM
[] : no respiratory distress [Respiration, Rhythm And Depth] : normal respiratory rhythm and effort [Exaggerated Use Of Accessory Muscles For Inspiration] : no accessory muscle use [Heart Rate And Rhythm] : heart rate was normal and rhythm regular [Examination Of The Chest] : the chest was normal in appearance [Chest Visual Inspection Thoracic Asymmetry] : no chest asymmetry [Diminished Respiratory Excursion] : normal chest expansion [2+] : left 2+ [Bowel Sounds] : normal bowel sounds [Abdomen Soft] : soft [Abdomen Tenderness] : non-tender [Involuntary Movements] : no involuntary movements were seen [Skin Color & Pigmentation] : normal skin color and pigmentation [Oriented To Time, Place, And Person] : oriented to person, place, and time [FreeTextEntry1] : + Expiratory wheeze in all lobes

## 2025-04-25 NOTE — HISTORY OF PRESENT ILLNESS
[FreeTextEntry1] : Mr. GALDINO JEFFERY, 45 year old male, never smoker, w/ hx of asthma since childhood, pectus excavatum, but with worsening control of his symptoms since COVID, who presented with abnormal CT findings. Referred by Dr. García.  CT Chest on 10/15/24: - Right middle lobe 5 mm nodule (3 1, 81). - Right upper lobe partially peripherally calcified 1 cm nodule (301, 53).  PFTs on 11/7/24: FVC 50%, FEV1 46%, DLCO 67%  11/14/24: this was his very first CT scan, the 1 cm RUL nodule is partially calcified, I recommended a close surveillance, and patient to RTC in 3 months with repeat CT Chest w/o contrast.  CT Chest 02/04/25 NW: -Again noted the anterior/apical segments of right upper lobe and medial segment of right middle lobe are hyperlucent with diminished vascularity and emphysematous changes.  - Foci of bronchiectasis within these affected segments. Stable partially calcified right upper lobe nodule.  - Stable 5 mm right middle lobe nodule.  - Pectus excavatum deformity  3/4/25: Per Dr. García, would like patient to be evaluated for surgical resection of affected RUL lobe (given 1cm nodule, resection may be a better option than EBV)  Pt presents today for follow up to discuss surgery. + Shortness of breath upon exertion. Today, patient denies worsening SOB, chest pain, cough, hemoptysis, fever, chills, night sweats, lightheadedness or dizziness.

## 2025-05-13 ENCOUNTER — OUTPATIENT (OUTPATIENT)
Dept: OUTPATIENT SERVICES | Facility: HOSPITAL | Age: 45
LOS: 1 days | End: 2025-05-13
Payer: COMMERCIAL

## 2025-05-13 ENCOUNTER — APPOINTMENT (OUTPATIENT)
Dept: NUCLEAR MEDICINE | Facility: IMAGING CENTER | Age: 45
End: 2025-05-13
Payer: COMMERCIAL

## 2025-05-13 DIAGNOSIS — R91.8 OTHER NONSPECIFIC ABNORMAL FINDING OF LUNG FIELD: ICD-10-CM

## 2025-05-13 PROCEDURE — A9540: CPT

## 2025-05-13 PROCEDURE — 78597 LUNG PERFUSION DIFFERENTIAL: CPT | Mod: 26

## 2025-05-13 PROCEDURE — 78597 LUNG PERFUSION DIFFERENTIAL: CPT

## 2025-06-05 ENCOUNTER — APPOINTMENT (OUTPATIENT)
Dept: PULMONOLOGY | Facility: CLINIC | Age: 45
End: 2025-06-05
Payer: COMMERCIAL

## 2025-06-05 VITALS
DIASTOLIC BLOOD PRESSURE: 80 MMHG | HEART RATE: 79 BPM | WEIGHT: 225 LBS | HEIGHT: 69 IN | BODY MASS INDEX: 33.33 KG/M2 | OXYGEN SATURATION: 96 % | SYSTOLIC BLOOD PRESSURE: 129 MMHG

## 2025-06-05 DIAGNOSIS — J45.909 UNSPECIFIED ASTHMA, UNCOMPLICATED: ICD-10-CM

## 2025-06-05 PROCEDURE — G2211 COMPLEX E/M VISIT ADD ON: CPT

## 2025-06-05 PROCEDURE — 99215 OFFICE O/P EST HI 40 MIN: CPT

## 2025-06-05 RX ORDER — BUDESONIDE 180 UG/1
180 AEROSOL, POWDER RESPIRATORY (INHALATION)
Qty: 3 | Refills: 1 | Status: ACTIVE | COMMUNITY
Start: 2025-06-05 | End: 1900-01-01

## 2025-06-17 ENCOUNTER — OUTPATIENT (OUTPATIENT)
Dept: OUTPATIENT SERVICES | Facility: HOSPITAL | Age: 45
LOS: 1 days | End: 2025-06-17

## 2025-06-17 VITALS
RESPIRATION RATE: 16 BRPM | HEART RATE: 79 BPM | DIASTOLIC BLOOD PRESSURE: 89 MMHG | WEIGHT: 220.02 LBS | SYSTOLIC BLOOD PRESSURE: 151 MMHG | OXYGEN SATURATION: 99 % | HEIGHT: 68 IN | TEMPERATURE: 97 F

## 2025-06-17 DIAGNOSIS — R91.8 OTHER NONSPECIFIC ABNORMAL FINDING OF LUNG FIELD: ICD-10-CM

## 2025-06-17 DIAGNOSIS — Z98.890 OTHER SPECIFIED POSTPROCEDURAL STATES: Chronic | ICD-10-CM

## 2025-06-17 LAB
BASOPHILS # BLD AUTO: 0.04 K/UL — SIGNIFICANT CHANGE UP (ref 0–0.2)
BASOPHILS NFR BLD AUTO: 0.4 % — SIGNIFICANT CHANGE UP (ref 0–2)
BLD GP AB SCN SERPL QL: NEGATIVE — SIGNIFICANT CHANGE UP
EOSINOPHIL # BLD AUTO: 0.25 K/UL — SIGNIFICANT CHANGE UP (ref 0–0.5)
EOSINOPHIL NFR BLD AUTO: 2.5 % — SIGNIFICANT CHANGE UP (ref 0–6)
HCT VFR BLD CALC: 43.7 % — SIGNIFICANT CHANGE UP (ref 39–50)
HGB BLD-MCNC: 14.8 G/DL — SIGNIFICANT CHANGE UP (ref 13–17)
IMM GRANULOCYTES NFR BLD AUTO: 0.7 % — SIGNIFICANT CHANGE UP (ref 0–0.9)
LYMPHOCYTES # BLD AUTO: 3.82 K/UL — HIGH (ref 1–3.3)
LYMPHOCYTES # BLD AUTO: 38.8 % — SIGNIFICANT CHANGE UP (ref 13–44)
MCHC RBC-ENTMCNC: 27.4 PG — SIGNIFICANT CHANGE UP (ref 27–34)
MCHC RBC-ENTMCNC: 33.9 G/DL — SIGNIFICANT CHANGE UP (ref 32–36)
MCV RBC AUTO: 80.8 FL — SIGNIFICANT CHANGE UP (ref 80–100)
MONOCYTES # BLD AUTO: 0.49 K/UL — SIGNIFICANT CHANGE UP (ref 0–0.9)
MONOCYTES NFR BLD AUTO: 5 % — SIGNIFICANT CHANGE UP (ref 2–14)
NEUTROPHILS # BLD AUTO: 5.18 K/UL — SIGNIFICANT CHANGE UP (ref 1.8–7.4)
NEUTROPHILS NFR BLD AUTO: 52.6 % — SIGNIFICANT CHANGE UP (ref 43–77)
PLATELET # BLD AUTO: 340 K/UL — SIGNIFICANT CHANGE UP (ref 150–400)
RBC # BLD: 5.41 M/UL — SIGNIFICANT CHANGE UP (ref 4.2–5.8)
RBC # FLD: 13.6 % — SIGNIFICANT CHANGE UP (ref 10.3–14.5)
RH IG SCN BLD-IMP: POSITIVE — SIGNIFICANT CHANGE UP
RH IG SCN BLD-IMP: POSITIVE — SIGNIFICANT CHANGE UP
WBC # BLD: 9.85 K/UL — SIGNIFICANT CHANGE UP (ref 3.8–10.5)
WBC # FLD AUTO: 9.85 K/UL — SIGNIFICANT CHANGE UP (ref 3.8–10.5)

## 2025-06-17 RX ORDER — SODIUM CHLORIDE 9 G/1000ML
1000 INJECTION, SOLUTION INTRAVENOUS
Refills: 0 | Status: DISCONTINUED | OUTPATIENT
Start: 2025-06-23 | End: 2025-06-24

## 2025-06-17 NOTE — H&P PST ADULT - NSICDXPASTMEDICALHX_GEN_ALL_CORE_FT
PAST MEDICAL HISTORY:  Asthma     Emphysema of right lung     Nodule of right lung      PAST MEDICAL HISTORY:  Asthma     Emphysema of right lung     Nodule of right lung     Pectus excavatum

## 2025-06-17 NOTE — H&P PST ADULT - RESPIRATORY AND THORAX COMMENTS
Hx Asthma Since childhood, Denies hx of hospitalization or intubation, last use rescue was on 6/16/25.  recently diagnosed with emphysema

## 2025-06-17 NOTE — H&P PST ADULT - PROBLEM SELECTOR PLAN 1
Patient tentatively scheduled for right video assisted thoracoscopy robotic assisted right upper lobe lobectomy, mediastinal lymph node dissection.     Pre-op instructions provided. Hibiclens provided with instructions and was signed by patient. Teach-back method was utilized to assess patient's understanding. Patient verbalized understanding.    ordered CBC, Type and ABO    Patient instructed to continue inhaler as prescribed    Patient instructed to take singular  with a sip of water on the morning of procedure.

## 2025-06-17 NOTE — H&P PST ADULT - NSANTHOSAYNRD_GEN_A_CORE
No. YAA screening performed.  STOP BANG Legend: 0-2 = LOW Risk; 3-4 = INTERMEDIATE Risk; 5-8 = HIGH Risk Neck circumference 20 inch/No. YAA screening performed.  STOP BANG Legend: 0-2 = LOW Risk; 3-4 = INTERMEDIATE Risk; 5-8 = HIGH Risk

## 2025-06-17 NOTE — H&P PST ADULT - HISTORY OF PRESENT ILLNESS
45 year old male, never smoker, w/ hx of asthma since childhood, pectus excavatum, but with worsening control of his symptoms since COVID, recently had abnormal CT findings. Right upper lobe  1 cm nodule . Patient is scheduled for right video assisted thoracoscopy robotic assisted right upper lobe lobectomy, mediastinal lymph node dissection.

## 2025-06-20 NOTE — ASU PATIENT PROFILE, ADULT - VISION (WITH CORRECTIVE LENSES IF THE PATIENT USUALLY WEARS THEM):
Normal vision: sees adequately in most situations; can see medication labels, newsprint Referred To Plastics For Closure Text (Leave Blank If You Do Not Want): After obtaining clear surgical margins the patient was sent to plastics for surgical repair.  The patient understands they will receive post-surgical care and follow-up from the referring physician's office.

## 2025-06-20 NOTE — ASU PATIENT PROFILE, ADULT - NSICDXPASTMEDICALHX_GEN_ALL_CORE_FT
PAST MEDICAL HISTORY:  Asthma     Emphysema of right lung     Nodule of right lung     Pectus excavatum

## 2025-06-23 ENCOUNTER — TRANSCRIPTION ENCOUNTER (OUTPATIENT)
Age: 45
End: 2025-06-23

## 2025-06-23 ENCOUNTER — RESULT REVIEW (OUTPATIENT)
Age: 45
End: 2025-06-23

## 2025-06-23 ENCOUNTER — INPATIENT (INPATIENT)
Facility: HOSPITAL | Age: 45
LOS: 2 days | Discharge: HOME | End: 2025-06-26
Attending: THORACIC SURGERY (CARDIOTHORACIC VASCULAR SURGERY) | Admitting: THORACIC SURGERY (CARDIOTHORACIC VASCULAR SURGERY)
Payer: COMMERCIAL

## 2025-06-23 ENCOUNTER — APPOINTMENT (OUTPATIENT)
Dept: THORACIC SURGERY | Facility: HOSPITAL | Age: 45
End: 2025-06-23

## 2025-06-23 VITALS
HEIGHT: 68 IN | DIASTOLIC BLOOD PRESSURE: 86 MMHG | HEART RATE: 96 BPM | WEIGHT: 220.02 LBS | TEMPERATURE: 98 F | OXYGEN SATURATION: 99 % | RESPIRATION RATE: 16 BRPM | SYSTOLIC BLOOD PRESSURE: 137 MMHG

## 2025-06-23 DIAGNOSIS — R91.8 OTHER NONSPECIFIC ABNORMAL FINDING OF LUNG FIELD: ICD-10-CM

## 2025-06-23 DIAGNOSIS — Z98.890 OTHER SPECIFIED POSTPROCEDURAL STATES: Chronic | ICD-10-CM

## 2025-06-23 PROCEDURE — 88342 IMHCHEM/IMCYTCHM 1ST ANTB: CPT | Mod: 26

## 2025-06-23 PROCEDURE — 88341 IMHCHEM/IMCYTCHM EA ADD ANTB: CPT | Mod: 26

## 2025-06-23 PROCEDURE — 88307 TISSUE EXAM BY PATHOLOGIST: CPT | Mod: 26

## 2025-06-23 PROCEDURE — S2900 ROBOTIC SURGICAL SYSTEM: CPT | Mod: NC

## 2025-06-23 PROCEDURE — 32655 THORACOSCOPY RESECT BULLAE: CPT | Mod: 22,RT

## 2025-06-23 PROCEDURE — 32655 THORACOSCOPY RESECT BULLAE: CPT | Mod: AS,RT

## 2025-06-23 PROCEDURE — 31645 BRNCHSC W/THER ASPIR 1ST: CPT

## 2025-06-23 PROCEDURE — 88313 SPECIAL STAINS GROUP 2: CPT | Mod: 26

## 2025-06-23 PROCEDURE — 71045 X-RAY EXAM CHEST 1 VIEW: CPT | Mod: 26

## 2025-06-23 PROCEDURE — 99233 SBSQ HOSP IP/OBS HIGH 50: CPT

## 2025-06-23 DEVICE — CHEST DRAIN PLEUR-EVAC 28FR STRAIGHT: Type: IMPLANTABLE DEVICE | Status: FUNCTIONAL

## 2025-06-23 DEVICE — SURGCEL 4 X 8": Type: IMPLANTABLE DEVICE | Status: FUNCTIONAL

## 2025-06-23 DEVICE — STAPLER COVIDIEN TRI-STAPLE 45MM PURPLE INTELLIGENT RELOAD: Type: IMPLANTABLE DEVICE | Status: FUNCTIONAL

## 2025-06-23 DEVICE — STAPLER COVIDIEN TRI-STAPLE 60MM BLACK INTELLIGENT RELOAD: Type: IMPLANTABLE DEVICE | Status: FUNCTIONAL

## 2025-06-23 RX ORDER — MONTELUKAST SODIUM 10 MG/1
10 TABLET ORAL DAILY
Refills: 0 | Status: DISCONTINUED | OUTPATIENT
Start: 2025-06-23 | End: 2025-06-26

## 2025-06-23 RX ORDER — LIDOCAINE HYDROCHLORIDE 20 MG/ML
1 JELLY TOPICAL DAILY
Refills: 0 | Status: DISCONTINUED | OUTPATIENT
Start: 2025-06-23 | End: 2025-06-26

## 2025-06-23 RX ORDER — ALBUTEROL SULFATE 2.5 MG/3ML
2.5 VIAL, NEBULIZER (ML) INHALATION EVERY 6 HOURS
Refills: 0 | Status: DISCONTINUED | OUTPATIENT
Start: 2025-06-23 | End: 2025-06-26

## 2025-06-23 RX ORDER — SENNA 187 MG
2 TABLET ORAL AT BEDTIME
Refills: 0 | Status: DISCONTINUED | OUTPATIENT
Start: 2025-06-23 | End: 2025-06-26

## 2025-06-23 RX ORDER — POLYETHYLENE GLYCOL 3350 17 G/17G
17 POWDER, FOR SOLUTION ORAL DAILY
Refills: 0 | Status: DISCONTINUED | OUTPATIENT
Start: 2025-06-23 | End: 2025-06-26

## 2025-06-23 RX ORDER — MONTELUKAST SODIUM 10 MG/1
1 TABLET ORAL
Refills: 0 | DISCHARGE

## 2025-06-23 RX ORDER — ONDANSETRON HCL/PF 4 MG/2 ML
4 VIAL (ML) INJECTION EVERY 6 HOURS
Refills: 0 | Status: DISCONTINUED | OUTPATIENT
Start: 2025-06-23 | End: 2025-06-26

## 2025-06-23 RX ORDER — HYDROMORPHONE/SOD CHLOR,ISO/PF 2 MG/10 ML
30 SYRINGE (ML) INJECTION
Refills: 0 | Status: DISCONTINUED | OUTPATIENT
Start: 2025-06-23 | End: 2025-06-24

## 2025-06-23 RX ORDER — NALOXONE HYDROCHLORIDE 0.4 MG/ML
0.1 INJECTION, SOLUTION INTRAMUSCULAR; INTRAVENOUS; SUBCUTANEOUS
Refills: 0 | Status: DISCONTINUED | OUTPATIENT
Start: 2025-06-23 | End: 2025-06-26

## 2025-06-23 RX ORDER — HEPARIN SODIUM 1000 [USP'U]/ML
5000 INJECTION INTRAVENOUS; SUBCUTANEOUS EVERY 8 HOURS
Refills: 0 | Status: DISCONTINUED | OUTPATIENT
Start: 2025-06-23 | End: 2025-06-26

## 2025-06-23 RX ORDER — METOCLOPRAMIDE HCL 10 MG
10 TABLET ORAL ONCE
Refills: 0 | Status: COMPLETED | OUTPATIENT
Start: 2025-06-23 | End: 2025-06-23

## 2025-06-23 RX ORDER — HEPARIN SODIUM 1000 [USP'U]/ML
5000 INJECTION INTRAVENOUS; SUBCUTANEOUS ONCE
Refills: 0 | Status: COMPLETED | OUTPATIENT
Start: 2025-06-23 | End: 2025-06-23

## 2025-06-23 RX ORDER — ACETAMINOPHEN 500 MG/5ML
975 LIQUID (ML) ORAL ONCE
Refills: 0 | Status: COMPLETED | OUTPATIENT
Start: 2025-06-23 | End: 2025-06-23

## 2025-06-23 RX ORDER — HYDROMORPHONE/SOD CHLOR,ISO/PF 2 MG/10 ML
0.5 SYRINGE (ML) INJECTION
Refills: 0 | Status: DISCONTINUED | OUTPATIENT
Start: 2025-06-23 | End: 2025-06-24

## 2025-06-23 RX ORDER — ACETAMINOPHEN 500 MG/5ML
1000 LIQUID (ML) ORAL EVERY 6 HOURS
Refills: 0 | Status: COMPLETED | OUTPATIENT
Start: 2025-06-23 | End: 2025-06-24

## 2025-06-23 RX ADMIN — SODIUM CHLORIDE 30 MILLILITER(S): 9 INJECTION, SOLUTION INTRAVENOUS at 06:29

## 2025-06-23 RX ADMIN — Medication 400 MILLIGRAM(S): at 15:19

## 2025-06-23 RX ADMIN — Medication 10 MILLIGRAM(S): at 18:42

## 2025-06-23 RX ADMIN — Medication 2.5 MILLIGRAM(S): at 16:41

## 2025-06-23 RX ADMIN — Medication 30 MILLILITER(S): at 19:16

## 2025-06-23 RX ADMIN — Medication 4 MILLILITER(S): at 21:59

## 2025-06-23 RX ADMIN — Medication 400 MILLIGRAM(S): at 21:35

## 2025-06-23 RX ADMIN — Medication 975 MILLIGRAM(S): at 06:38

## 2025-06-23 RX ADMIN — HEPARIN SODIUM 5000 UNIT(S): 1000 INJECTION INTRAVENOUS; SUBCUTANEOUS at 16:28

## 2025-06-23 RX ADMIN — LIDOCAINE HYDROCHLORIDE 1 PATCH: 20 JELLY TOPICAL at 15:19

## 2025-06-23 RX ADMIN — SODIUM CHLORIDE 30 MILLILITER(S): 9 INJECTION, SOLUTION INTRAVENOUS at 19:16

## 2025-06-23 RX ADMIN — Medication 2.5 MILLIGRAM(S): at 21:58

## 2025-06-23 RX ADMIN — HEPARIN SODIUM 5000 UNIT(S): 1000 INJECTION INTRAVENOUS; SUBCUTANEOUS at 21:36

## 2025-06-23 RX ADMIN — HEPARIN SODIUM 5000 UNIT(S): 1000 INJECTION INTRAVENOUS; SUBCUTANEOUS at 06:29

## 2025-06-23 RX ADMIN — SODIUM CHLORIDE 30 MILLILITER(S): 9 INJECTION, SOLUTION INTRAVENOUS at 14:30

## 2025-06-23 RX ADMIN — Medication 30 MILLILITER(S): at 14:31

## 2025-06-23 RX ADMIN — Medication 975 MILLIGRAM(S): at 06:29

## 2025-06-23 RX ADMIN — LIDOCAINE HYDROCHLORIDE 1 PATCH: 20 JELLY TOPICAL at 20:22

## 2025-06-23 NOTE — PATIENT PROFILE ADULT - FALL HARM RISK - UNIVERSAL INTERVENTIONS
Bed in lowest position, wheels locked, appropriate side rails in place/Call bell, personal items and telephone in reach/Instruct patient to call for assistance before getting out of bed or chair/Non-slip footwear when patient is out of bed/[place] to call system/Physically safe environment - no spills, clutter or unnecessary equipment/Purposeful Proactive Rounding/Room/bathroom lighting operational, light cord in reach

## 2025-06-23 NOTE — PATIENT PROFILE ADULT - ARE SIGNIFICANT INDICATORS COMPLETE.
Showering allowed/Walking-Indoors allowed/No Heavy lifting/straining/Stairs allowed/Do not make important decisions/Do not drive or operate machinery/Walking-Outdoors allowed Yes

## 2025-06-23 NOTE — PATIENT PROFILE ADULT - FALL HARM RISK - CONCLUSION
How Severe Is Your Rash?: moderate Is This A New Presentation, Or A Follow-Up?: Rash Universal Safety Interventions
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
## 2025-06-23 NOTE — BRIEF OPERATIVE NOTE - OPERATION/FINDINGS
Emphysematous RUL, unable to decompress, limiting working space, poorly developed horizontal fissure. ICG assisted wedge resection of major portion of the RUL.

## 2025-06-24 LAB
ANION GAP SERPL CALC-SCNC: 14 MMOL/L — SIGNIFICANT CHANGE UP (ref 7–14)
BASOPHILS # BLD AUTO: 0.02 K/UL — SIGNIFICANT CHANGE UP (ref 0–0.2)
BASOPHILS NFR BLD AUTO: 0.1 % — SIGNIFICANT CHANGE UP (ref 0–2)
BUN SERPL-MCNC: 16 MG/DL — SIGNIFICANT CHANGE UP (ref 7–23)
CALCIUM SERPL-MCNC: 8.2 MG/DL — LOW (ref 8.4–10.5)
CHLORIDE SERPL-SCNC: 99 MMOL/L — SIGNIFICANT CHANGE UP (ref 98–107)
CO2 SERPL-SCNC: 23 MMOL/L — SIGNIFICANT CHANGE UP (ref 22–31)
CREAT SERPL-MCNC: 0.79 MG/DL — SIGNIFICANT CHANGE UP (ref 0.5–1.3)
EGFR: 110 ML/MIN/1.73M2 — SIGNIFICANT CHANGE UP
EGFR: 110 ML/MIN/1.73M2 — SIGNIFICANT CHANGE UP
EOSINOPHIL # BLD AUTO: 0 K/UL — SIGNIFICANT CHANGE UP (ref 0–0.5)
EOSINOPHIL NFR BLD AUTO: 0 % — SIGNIFICANT CHANGE UP (ref 0–6)
GLUCOSE SERPL-MCNC: 157 MG/DL — HIGH (ref 70–99)
HCT VFR BLD CALC: 43.4 % — SIGNIFICANT CHANGE UP (ref 39–50)
HGB BLD-MCNC: 14.7 G/DL — SIGNIFICANT CHANGE UP (ref 13–17)
IMM GRANULOCYTES # BLD AUTO: 0.08 K/UL — HIGH (ref 0–0.07)
IMM GRANULOCYTES NFR BLD AUTO: 0.5 % — SIGNIFICANT CHANGE UP (ref 0–0.9)
LYMPHOCYTES # BLD AUTO: 1.07 K/UL — SIGNIFICANT CHANGE UP (ref 1–3.3)
LYMPHOCYTES NFR BLD AUTO: 6.4 % — LOW (ref 13–44)
MAGNESIUM SERPL-MCNC: 1.8 MG/DL — SIGNIFICANT CHANGE UP (ref 1.6–2.6)
MCHC RBC-ENTMCNC: 28.1 PG — SIGNIFICANT CHANGE UP (ref 27–34)
MCHC RBC-ENTMCNC: 33.9 G/DL — SIGNIFICANT CHANGE UP (ref 32–36)
MCV RBC AUTO: 82.8 FL — SIGNIFICANT CHANGE UP (ref 80–100)
MONOCYTES # BLD AUTO: 0.56 K/UL — SIGNIFICANT CHANGE UP (ref 0–0.9)
MONOCYTES NFR BLD AUTO: 3.4 % — SIGNIFICANT CHANGE UP (ref 2–14)
MRSA PCR RESULT.: SIGNIFICANT CHANGE UP
NEUTROPHILS # BLD AUTO: 14.86 K/UL — HIGH (ref 1.8–7.4)
NEUTROPHILS NFR BLD AUTO: 89.6 % — HIGH (ref 43–77)
NRBC # BLD AUTO: 0 K/UL — SIGNIFICANT CHANGE UP (ref 0–0)
NRBC # FLD: 0 K/UL — SIGNIFICANT CHANGE UP (ref 0–0)
NRBC BLD AUTO-RTO: 0 /100 WBCS — SIGNIFICANT CHANGE UP (ref 0–0)
PHOSPHATE SERPL-MCNC: 2.6 MG/DL — SIGNIFICANT CHANGE UP (ref 2.5–4.5)
PLATELET # BLD AUTO: 330 K/UL — SIGNIFICANT CHANGE UP (ref 150–400)
PMV BLD: 9.1 FL — SIGNIFICANT CHANGE UP (ref 7–13)
POTASSIUM SERPL-MCNC: 3.7 MMOL/L — SIGNIFICANT CHANGE UP (ref 3.5–5.3)
POTASSIUM SERPL-SCNC: 3.7 MMOL/L — SIGNIFICANT CHANGE UP (ref 3.5–5.3)
RBC # BLD: 5.24 M/UL — SIGNIFICANT CHANGE UP (ref 4.2–5.8)
RBC # FLD: 13.5 % — SIGNIFICANT CHANGE UP (ref 10.3–14.5)
S AUREUS DNA NOSE QL NAA+PROBE: DETECTED
SODIUM SERPL-SCNC: 136 MMOL/L — SIGNIFICANT CHANGE UP (ref 135–145)
WBC # BLD: 16.59 K/UL — HIGH (ref 3.8–10.5)
WBC # FLD AUTO: 16.59 K/UL — HIGH (ref 3.8–10.5)

## 2025-06-24 PROCEDURE — 99233 SBSQ HOSP IP/OBS HIGH 50: CPT

## 2025-06-24 PROCEDURE — 71045 X-RAY EXAM CHEST 1 VIEW: CPT | Mod: 26

## 2025-06-24 RX ORDER — MUPIROCIN CALCIUM 20 MG/G
1 CREAM TOPICAL
Refills: 0 | Status: DISCONTINUED | OUTPATIENT
Start: 2025-06-24 | End: 2025-06-26

## 2025-06-24 RX ORDER — OXYCODONE HYDROCHLORIDE 30 MG/1
10 TABLET ORAL
Refills: 0 | Status: DISCONTINUED | OUTPATIENT
Start: 2025-06-24 | End: 2025-06-26

## 2025-06-24 RX ORDER — OXYCODONE HYDROCHLORIDE 30 MG/1
5 TABLET ORAL
Refills: 0 | Status: DISCONTINUED | OUTPATIENT
Start: 2025-06-24 | End: 2025-06-26

## 2025-06-24 RX ORDER — ALBUMIN (HUMAN) 12.5 G/50ML
250 INJECTION, SOLUTION INTRAVENOUS ONCE
Refills: 0 | Status: COMPLETED | OUTPATIENT
Start: 2025-06-24 | End: 2025-06-24

## 2025-06-24 RX ORDER — MAGNESIUM SULFATE 500 MG/ML
1 SYRINGE (ML) INJECTION ONCE
Refills: 0 | Status: COMPLETED | OUTPATIENT
Start: 2025-06-24 | End: 2025-06-24

## 2025-06-24 RX ADMIN — Medication 1 DOSE(S): at 03:25

## 2025-06-24 RX ADMIN — ALBUMIN (HUMAN) 250 MILLILITER(S): 12.5 INJECTION, SOLUTION INTRAVENOUS at 17:17

## 2025-06-24 RX ADMIN — Medication 2.5 MILLIGRAM(S): at 09:21

## 2025-06-24 RX ADMIN — MUPIROCIN CALCIUM 1 APPLICATION(S): 20 CREAM TOPICAL at 17:01

## 2025-06-24 RX ADMIN — HEPARIN SODIUM 5000 UNIT(S): 1000 INJECTION INTRAVENOUS; SUBCUTANEOUS at 05:00

## 2025-06-24 RX ADMIN — Medication 4 MILLILITER(S): at 22:32

## 2025-06-24 RX ADMIN — OXYCODONE HYDROCHLORIDE 10 MILLIGRAM(S): 30 TABLET ORAL at 21:17

## 2025-06-24 RX ADMIN — MUPIROCIN CALCIUM 1 APPLICATION(S): 20 CREAM TOPICAL at 09:22

## 2025-06-24 RX ADMIN — Medication 2.5 MILLIGRAM(S): at 15:14

## 2025-06-24 RX ADMIN — OXYCODONE HYDROCHLORIDE 5 MILLIGRAM(S): 30 TABLET ORAL at 16:17

## 2025-06-24 RX ADMIN — OXYCODONE HYDROCHLORIDE 10 MILLIGRAM(S): 30 TABLET ORAL at 23:29

## 2025-06-24 RX ADMIN — Medication 30 MILLILITER(S): at 08:20

## 2025-06-24 RX ADMIN — Medication 400 MILLIGRAM(S): at 05:00

## 2025-06-24 RX ADMIN — Medication 40 MILLIEQUIVALENT(S): at 04:59

## 2025-06-24 RX ADMIN — Medication 2.5 MILLIGRAM(S): at 22:31

## 2025-06-24 RX ADMIN — LIDOCAINE HYDROCHLORIDE 1 PATCH: 20 JELLY TOPICAL at 12:17

## 2025-06-24 RX ADMIN — OXYCODONE HYDROCHLORIDE 10 MILLIGRAM(S): 30 TABLET ORAL at 20:17

## 2025-06-24 RX ADMIN — Medication 4 MILLILITER(S): at 09:21

## 2025-06-24 RX ADMIN — MONTELUKAST SODIUM 10 MILLIGRAM(S): 10 TABLET ORAL at 12:17

## 2025-06-24 RX ADMIN — Medication 400 MILLIGRAM(S): at 12:17

## 2025-06-24 RX ADMIN — Medication 1000 MILLIGRAM(S): at 05:16

## 2025-06-24 RX ADMIN — Medication 1 APPLICATION(S): at 05:59

## 2025-06-24 RX ADMIN — Medication 2.5 MILLIGRAM(S): at 03:26

## 2025-06-24 RX ADMIN — HEPARIN SODIUM 5000 UNIT(S): 1000 INJECTION INTRAVENOUS; SUBCUTANEOUS at 22:01

## 2025-06-24 RX ADMIN — LIDOCAINE HYDROCHLORIDE 1 PATCH: 20 JELLY TOPICAL at 19:24

## 2025-06-24 RX ADMIN — SODIUM CHLORIDE 30 MILLILITER(S): 9 INJECTION, SOLUTION INTRAVENOUS at 09:23

## 2025-06-24 RX ADMIN — Medication 100 GRAM(S): at 04:58

## 2025-06-24 RX ADMIN — Medication 1 DOSE(S): at 09:20

## 2025-06-24 RX ADMIN — LIDOCAINE HYDROCHLORIDE 1 PATCH: 20 JELLY TOPICAL at 04:31

## 2025-06-25 LAB
ANION GAP SERPL CALC-SCNC: 16 MMOL/L — HIGH (ref 7–14)
BUN SERPL-MCNC: 13 MG/DL — SIGNIFICANT CHANGE UP (ref 7–23)
CALCIUM SERPL-MCNC: 8.2 MG/DL — LOW (ref 8.4–10.5)
CHLORIDE SERPL-SCNC: 100 MMOL/L — SIGNIFICANT CHANGE UP (ref 98–107)
CO2 SERPL-SCNC: 20 MMOL/L — LOW (ref 22–31)
CREAT SERPL-MCNC: 0.8 MG/DL — SIGNIFICANT CHANGE UP (ref 0.5–1.3)
EGFR: 110 ML/MIN/1.73M2 — SIGNIFICANT CHANGE UP
EGFR: 110 ML/MIN/1.73M2 — SIGNIFICANT CHANGE UP
GLUCOSE SERPL-MCNC: 123 MG/DL — HIGH (ref 70–99)
HCT VFR BLD CALC: 44.2 % — SIGNIFICANT CHANGE UP (ref 39–50)
HGB BLD-MCNC: 14.5 G/DL — SIGNIFICANT CHANGE UP (ref 13–17)
MAGNESIUM SERPL-MCNC: 2.2 MG/DL — SIGNIFICANT CHANGE UP (ref 1.6–2.6)
MCHC RBC-ENTMCNC: 27.9 PG — SIGNIFICANT CHANGE UP (ref 27–34)
MCHC RBC-ENTMCNC: 32.8 G/DL — SIGNIFICANT CHANGE UP (ref 32–36)
MCV RBC AUTO: 85 FL — SIGNIFICANT CHANGE UP (ref 80–100)
NRBC # BLD AUTO: 0 K/UL — SIGNIFICANT CHANGE UP (ref 0–0)
NRBC # FLD: 0 K/UL — SIGNIFICANT CHANGE UP (ref 0–0)
NRBC BLD AUTO-RTO: 0 /100 WBCS — SIGNIFICANT CHANGE UP (ref 0–0)
PHOSPHATE SERPL-MCNC: 2.1 MG/DL — LOW (ref 2.5–4.5)
PLATELET # BLD AUTO: 320 K/UL — SIGNIFICANT CHANGE UP (ref 150–400)
PMV BLD: 9.4 FL — SIGNIFICANT CHANGE UP (ref 7–13)
POTASSIUM SERPL-MCNC: 4 MMOL/L — SIGNIFICANT CHANGE UP (ref 3.5–5.3)
POTASSIUM SERPL-SCNC: 4 MMOL/L — SIGNIFICANT CHANGE UP (ref 3.5–5.3)
RBC # BLD: 5.2 M/UL — SIGNIFICANT CHANGE UP (ref 4.2–5.8)
RBC # FLD: 14 % — SIGNIFICANT CHANGE UP (ref 10.3–14.5)
SODIUM SERPL-SCNC: 136 MMOL/L — SIGNIFICANT CHANGE UP (ref 135–145)
WBC # BLD: 14.59 K/UL — HIGH (ref 3.8–10.5)
WBC # FLD AUTO: 14.59 K/UL — HIGH (ref 3.8–10.5)

## 2025-06-25 PROCEDURE — 71045 X-RAY EXAM CHEST 1 VIEW: CPT | Mod: 26,76

## 2025-06-25 RX ADMIN — Medication 1 DOSE(S): at 21:15

## 2025-06-25 RX ADMIN — Medication 2 TABLET(S): at 21:15

## 2025-06-25 RX ADMIN — OXYCODONE HYDROCHLORIDE 10 MILLIGRAM(S): 30 TABLET ORAL at 22:15

## 2025-06-25 RX ADMIN — LIDOCAINE HYDROCHLORIDE 1 PATCH: 20 JELLY TOPICAL at 19:29

## 2025-06-25 RX ADMIN — OXYCODONE HYDROCHLORIDE 10 MILLIGRAM(S): 30 TABLET ORAL at 04:04

## 2025-06-25 RX ADMIN — HEPARIN SODIUM 5000 UNIT(S): 1000 INJECTION INTRAVENOUS; SUBCUTANEOUS at 21:16

## 2025-06-25 RX ADMIN — Medication 1 DOSE(S): at 09:49

## 2025-06-25 RX ADMIN — OXYCODONE HYDROCHLORIDE 10 MILLIGRAM(S): 30 TABLET ORAL at 18:40

## 2025-06-25 RX ADMIN — Medication 1 APPLICATION(S): at 05:40

## 2025-06-25 RX ADMIN — OXYCODONE HYDROCHLORIDE 10 MILLIGRAM(S): 30 TABLET ORAL at 09:47

## 2025-06-25 RX ADMIN — OXYCODONE HYDROCHLORIDE 10 MILLIGRAM(S): 30 TABLET ORAL at 00:29

## 2025-06-25 RX ADMIN — Medication 4 MILLILITER(S): at 20:54

## 2025-06-25 RX ADMIN — Medication 2.5 MILLIGRAM(S): at 09:50

## 2025-06-25 RX ADMIN — OXYCODONE HYDROCHLORIDE 10 MILLIGRAM(S): 30 TABLET ORAL at 18:10

## 2025-06-25 RX ADMIN — MONTELUKAST SODIUM 10 MILLIGRAM(S): 10 TABLET ORAL at 11:55

## 2025-06-25 RX ADMIN — POLYETHYLENE GLYCOL 3350 17 GRAM(S): 17 POWDER, FOR SOLUTION ORAL at 11:56

## 2025-06-25 RX ADMIN — Medication 2.5 MILLIGRAM(S): at 20:54

## 2025-06-25 RX ADMIN — HEPARIN SODIUM 5000 UNIT(S): 1000 INJECTION INTRAVENOUS; SUBCUTANEOUS at 05:39

## 2025-06-25 RX ADMIN — MUPIROCIN CALCIUM 1 APPLICATION(S): 20 CREAM TOPICAL at 18:11

## 2025-06-25 RX ADMIN — LIDOCAINE HYDROCHLORIDE 1 PATCH: 20 JELLY TOPICAL at 11:56

## 2025-06-25 RX ADMIN — Medication 4 MILLILITER(S): at 09:50

## 2025-06-25 RX ADMIN — Medication 2.5 MILLIGRAM(S): at 15:23

## 2025-06-25 RX ADMIN — Medication 2.5 MILLIGRAM(S): at 03:42

## 2025-06-25 RX ADMIN — HEPARIN SODIUM 5000 UNIT(S): 1000 INJECTION INTRAVENOUS; SUBCUTANEOUS at 13:49

## 2025-06-25 RX ADMIN — OXYCODONE HYDROCHLORIDE 10 MILLIGRAM(S): 30 TABLET ORAL at 21:15

## 2025-06-25 RX ADMIN — LIDOCAINE HYDROCHLORIDE 1 PATCH: 20 JELLY TOPICAL at 00:20

## 2025-06-25 RX ADMIN — OXYCODONE HYDROCHLORIDE 10 MILLIGRAM(S): 30 TABLET ORAL at 10:20

## 2025-06-25 RX ADMIN — LIDOCAINE HYDROCHLORIDE 1 PATCH: 20 JELLY TOPICAL at 23:22

## 2025-06-25 NOTE — PROGRESS NOTE ADULT - SUBJECTIVE AND OBJECTIVE BOX
ANN SANTANA      45y   Male   [id number]         shellfish (Short breath; Hives)  No Known Drug Allergies             Daily     Daily Weight in k.8 (2025 00:00)Drug Dosing Weight  Height (cm): 172.7 (2025 05:46)  Weight (kg): 99.8 (2025 05:46)  BMI (kg/m2): 33.5 (2025 05:46)  BSA (m2): 2.13 (2025 05:46)    HPI:   45 year old male, never smoker, w/ hx of asthma since childhood, pectus excavatum, but with worsening control of his symptoms since COVID, recently had abnormal CT findings. Right upper lobe  1 cm nodule . Patient is scheduled for right video assisted thoracoscopy robotic assisted right upper lobe lobectomy, mediastinal lymph node dissection.    (2025 18:39)      CHIEF COMPLAINT: Follow up in ICU  for postoperative care of patient who is s/p lung surgery    Procedure: RUL wedge resection                       Issues:              Lung nodule              Postop pain              Chest tube in place              At risk for respiratory complications  Asthma  Emphysema of right lung  Pectus excavatum             Postop course:     Patient reports moderate pain at chest wall incision sites which is worse with coughing and deep breathing without associated fever or dyspnea. Pain is improved with use of PCA and  oral pain meds.         Home Medications:  Breztri Aerosphere 160 mcg-9 mcg-4.8 mcg/inh inhalation aerosol: 2 puff(s) inhaled 2 times a day (2025 06:02)  montelukast 10 mg oral tablet: 1 tab(s) orally once a day (2025 06:02)  Tezspire Pre-filled Syringe 210 mg/1.91 mL subcutaneous solution: 210 milligram(s) subcutaneously every 4 weeks (2025 06:02)    PAST MEDICAL & SURGICAL HISTORY:  Asthma      Emphysema of right lung      Nodule of right lung      Pectus excavatum      H/O arthroscopy of right knee      H/O arthroscopy of left knee        Vital Signs Last 24 Hrs  T(C): 37.2 (2025 08:00), Max: 37.2 (2025 08:00)  T(F): 98.9 (2025 08:00), Max: 98.9 (2025 08:00)  HR: 82 (2025 09:00) (68 - 97)  BP: 101/63 (2025 09:00) (101/63 - 147/83)  BP(mean): 76 (2025 09:00) (76 - 102)  RR: 14 (2025 09:00) (10 - 23)  SpO2: 91% (2025 09:00) (91% - 100%)    Parameters below as of 2025 10:00  Patient On (Oxygen Delivery Method): room air      I&O's Detail    2025 07:01  -  2025 07:00  --------------------------------------------------------  IN:    IV PiggyBack: 200 mL    Lactated Ringers: 540 mL  Total IN: 740 mL    OUT:    Chest Tube (mL): 230 mL    Voided (mL): 900 mL  Total OUT: 1130 mL    Total NET: -390 mL      2025 07:01  -  2025 10:06  --------------------------------------------------------  IN:    Lactated Ringers: 90 mL  Total IN: 90 mL    OUT:    Chest Tube (mL): 0 mL  Total OUT: 0 mL    Total NET: 90 mL        CAPILLARY BLOOD GLUCOSE        Home Medications:  Breztri Aerosphere 160 mcg-9 mcg-4.8 mcg/inh inhalation aerosol: 2 puff(s) inhaled 2 times a day (2025 06:02)  montelukast 10 mg oral tablet: 1 tab(s) orally once a day (2025 06:02)  Tezspire Pre-filled Syringe 210 mg/1.91 mL subcutaneous solution: 210 milligram(s) subcutaneously every 4 weeks (2025 06:02)    MEDICATIONS  (STANDING):  acetaminophen   IVPB .. 1000 milliGRAM(s) IV Intermittent every 6 hours  albuterol    0.083% 2.5 milliGRAM(s) Nebulizer every 6 hours  chlorhexidine 2% Cloths 1 Application(s) Topical daily  fluticasone propionate/ salmeterol 250-50 MICROgram(s) Diskus 1 Dose(s) Inhalation two times a day  heparin   Injectable 5000 Unit(s) SubCutaneous every 8 hours  HYDROmorphone PCA (1 mG/mL) 30 milliLiter(s) PCA Continuous PCA Continuous  lactated ringers. 1000 milliLiter(s) (30 mL/Hr) IV Continuous <Continuous>  lidocaine   4% Patch 1 Patch Transdermal daily  montelukast 10 milliGRAM(s) Oral daily  mupirocin 2% Nasal 1 Application(s) Both Nostrils two times a day  polyethylene glycol 3350 17 Gram(s) Oral daily  senna 2 Tablet(s) Oral at bedtime  sodium chloride 3%  Inhalation 4 milliLiter(s) Inhalation every 12 hours    MEDICATIONS  (PRN):  HYDROmorphone PCA (1 mG/mL) Rescue Clinician Bolus 0.5 milliGRAM(s) IV Push every 15 minutes PRN for Pain Scale GREATER THAN 6  naloxone Injectable 0.1 milliGRAM(s) IV Push every 3 minutes PRN For ANY of the following changes in patient status:  A. RR LESS THAN 10 breaths per minute, B. Oxygen saturation LESS THAN 90%, C. Sedation score of 6  ondansetron Injectable 4 milliGRAM(s) IV Push every 6 hours PRN Nausea          Physical exam:                             General:               Pt is awake, alert,  appears to be in pain but not in distress                                                  Neuro:                  Nonfocal                             Psych:                   A&Ox3                          Cardiovascular:   S1 & S2, regular                           Respiratory:         Air entry is fair and equal on both sides, has bilateral conducted sounds                           GI:                          Soft, nondistended and nontender, Bowel sounds active                            Ext:                        No cyanosis or edema     Labs:                                                                           14.7   16.59 )-----------( 330      ( 2025 02:20 )             43.4             06-24    136  |  99  |  16  ----------------------------<  157[H]  3.7   |  23  |  0.79    Ca    8.2[L]      2025 02:20  Phos  2.6     06-24  Mg     1.80     06-24                        Urinalysis Basic - ( 2025 02:20 )    Color: x / Appearance: x / SG: x / pH: x  Gluc: 157 mg/dL / Ketone: x  / Bili: x / Urobili: x   Blood: x / Protein: x / Nitrite: x   Leuk Esterase: x / RBC: x / WBC x   Sq Epi: x / Non Sq Epi: x / Bacteria: x      CXR:    < from: Xray Chest 1 View- PORTABLE-Routine (Xray Chest 1 View- PORTABLE-Routine in AM.) (25 @ 05:27) >  Right-sided chest tube unchanged.  Lungs show no focal consolidations  Heart size is normal.  No effusions or pneumothorax.      Plan:  General: 45yMale s/p RUL wedge resection , experiencing  pain with deep breathing.                             Neuro:                                         Pain control with Oxycodone / PCA / PCEA /  Tylenol PRN / Lidopatch                            Cardiovascular:                                          Telemetry (medical test) - Reviewed by me today independently. Pt is in normal sinus rhythm                                         Continue hemodynamic monitoring to prevent decompensation.                            Respiratory:                                         Postop hypoxemia requiring O2 via nasal cannula probably due to postop pain -  Resolved, on RA and looks comfortable                                             CXR is clear. Encourage incentive spirometry.                                                   Chest PT and frequent suctioning.                                          Asthma / COPD: Continue bronchodilators, inhaled steroids, Pulmozyme and inhaled 3% saline inhalations.                                         OOB to chair & ambulate w/ assistance.                                          Continuous pulse oximetry for support & to prevent decompensation.                                         Monitor chest tube output                                         Chest tube to suction                                                                                         GI                                         On puree diet, advance to  regular diet as tolerated                                         No indication for G.I prophylaxis                                          Continue Zofran / Reglan for nausea - PRN                                         Continue bowel regimen	                                                                 Renal:                                                                                  Monitor I/Os and electrolytes                                                                                        Hem/ Onc:                                         DVT prophylaxis with SQ Heparin and SCDs                                         Monitor chest tube output &  signs of bleeding.                                          Follow CBC in AM                           Infectious disease:                                            Monitor for fever / leukocytosis.                                          All surgical incision / chest tube  sites look clean                            Endocrine                                           No active issues                                                 Pertinent clinical, laboratory, radiographic, hemodynamic, echocardiographic, respiratory data, microbiologic data and chart were reviewed and analyzed frequently throughout the course of the day and night. Patient seen, examined and plan discussed with CT Surgeon Dr. Mclean / CTICU team during rounds.  OOB to chair and ambulate with physical therapy as tolerated.   Status discussed with patient and updated plan of care.   I have spent 50 minutes with this patient  monitoring hemodynamic status, respiratory status and  coordinating care in the ICU.        Juan Umanzor MD                                                                    
POST ANESTHESIA EVALUATION    45y Male POSTOP DAY 1     MENTAL STATUS: Patient participation [x  ] Awake     [  ] Arousable     [ x ] Sedated    AIRWAY PATENCY: [ x ] Satisfactory  [  ] Other:     Vital Signs Last 24 Hrs  T(C): 37.2 (24 Jun 2025 08:00), Max: 37.2 (24 Jun 2025 08:00)  T(F): 98.9 (24 Jun 2025 08:00), Max: 98.9 (24 Jun 2025 08:00)  HR: 99 (24 Jun 2025 10:00) (68 - 99)  BP: 112/78 (24 Jun 2025 10:00) (101/63 - 147/83)  BP(mean): 88 (24 Jun 2025 10:00) (76 - 102)  RR: 16 (24 Jun 2025 10:00) (10 - 23)  SpO2: 92% (24 Jun 2025 10:00) (91% - 100%)    Parameters below as of 24 Jun 2025 11:00  Patient On (Oxygen Delivery Method): room air      I&O's Summary    23 Jun 2025 07:01  -  24 Jun 2025 07:00  --------------------------------------------------------  IN: 740 mL / OUT: 1130 mL / NET: -390 mL    24 Jun 2025 07:01  -  24 Jun 2025 11:05  --------------------------------------------------------  IN: 90 mL / OUT: 0 mL / NET: 90 mL          NAUSEA/ VOMITTING:  [ x ] NONE  [  ] CONTROLLED [  ] OTHER     PAIN: [  x] CONTROLLED WITH CURRENT REGIMEN  [  ] OTHER    [ x ] NO APPARENT ANESTHESIA COMPLICATIONS      Comments: Discussed with ICU team member
SANTANAANN                     [id number]    HPI:   45 year old male, never smoker, w/ hx of asthma since childhood, pectus excavatum, but with worsening control of his symptoms since COVID, recently had abnormal CT findings. Right upper lobe  1 cm nodule . Patient is scheduled for right video assisted thoracoscopy robotic assisted right upper lobe lobectomy, mediastinal lymph node dissection.     Procedure: RUL wedge resection    Issues:  Bullous emphysema   Lung nodule  Post op pain  Chest tube in place    PAST MEDICAL & SURGICAL HISTORY:  Asthma      Emphysema of right lung      Nodule of right lung      Pectus excavatum      H/O arthroscopy of right knee      H/O arthroscopy of left knee                VITAL SIGNS:  Vital Signs Last 24 Hrs  T(C): 36.6 (23 Jun 2025 13:40), Max: 36.7 (23 Jun 2025 05:46)  T(F): 97.9 (23 Jun 2025 13:40), Max: 98.1 (23 Jun 2025 05:46)  HR: 73 (23 Jun 2025 14:00) (68 - 96)  BP: 118/74 (23 Jun 2025 14:00) (112/78 - 137/86)  BP(mean): 85 (23 Jun 2025 14:00) (85 - 95)  RR: 16 (23 Jun 2025 14:00) (12 - 16)  SpO2: 99% (23 Jun 2025 14:00) (97% - 99%)    Parameters below as of 23 Jun 2025 14:00  Patient On (Oxygen Delivery Method): nasal cannula w/ humidification  O2 Flow (L/min): 2      I/Os:   I&O's Detail    23 Jun 2025 07:01  -  23 Jun 2025 14:18  --------------------------------------------------------  IN:    Lactated Ringers: 60 mL  Total IN: 60 mL    OUT:    Chest Tube (mL): 60 mL  Total OUT: 60 mL    Total NET: 0 mL          CAPILLARY BLOOD GLUCOSE          =======================MEDICATIONS===================  MEDICATIONS  (STANDING):  chlorhexidine 2% Cloths 1 Application(s) Topical daily  fluticasone propionate/ salmeterol 250-50 MICROgram(s) Diskus 1 Dose(s) Inhalation two times a day  heparin   Injectable 5000 Unit(s) SubCutaneous every 8 hours  HYDROmorphone PCA (1 mG/mL) 30 milliLiter(s) PCA Continuous PCA Continuous  lactated ringers. 1000 milliLiter(s) (30 mL/Hr) IV Continuous <Continuous>  montelukast 10 milliGRAM(s) Oral daily  polyethylene glycol 3350 17 Gram(s) Oral daily  senna 2 Tablet(s) Oral at bedtime  sodium chloride 3%  Inhalation 4 milliLiter(s) Inhalation every 12 hours    MEDICATIONS  (PRN):  HYDROmorphone PCA (1 mG/mL) Rescue Clinician Bolus 0.5 milliGRAM(s) IV Push every 15 minutes PRN for Pain Scale GREATER THAN 6  naloxone Injectable 0.1 milliGRAM(s) IV Push every 3 minutes PRN For ANY of the following changes in patient status:  A. RR LESS THAN 10 breaths per minute, B. Oxygen saturation LESS THAN 90%, C. Sedation score of 6  ondansetron Injectable 4 milliGRAM(s) IV Push every 6 hours PRN Nausea      PHYSICAL EXAM============================  General:                         Awake, alert, not in any distress  Neuro:                            Moving all extremities to commands.   Respiratory:	Air entry fair and  bilateral conducted sounds                                           Effort even and unlabored.  CV:		Regular rate and rhythm. Normal S1/S2                                          Distal pulses present.  Abdomen:	                     Soft, non-distended. Bowel sounds present   Skin:		No rash.  Extremities:	Warm, no cyanosis or edema.  Palpable pulses    A/P:   45 year old male, never smoker, w/ hx of asthma since childhood, pectus excavatum, but with worsening control of his symptoms since COVID, recently had abnormal CT findings. Right upper lobe  1 cm nodule, s/p  RUL wedge resection  =============================NEUROLOGY============================  Pain control with PCA / Tylenol IV     ==============================RESPIRATORY========================  Resp insufficiency, post op,Bullous emphysema    Pt is face mask O2, wean as tolerated  Using incentive spirometry   Monitor chest tube output  Chest tube to suction 	  Continue bronchodilators, pulmonary toilet    ============================CARDIOVASCULAR======================  Continue hemodynamic monitoring.  Not on any pressors    =====================RENAL===================  Continue LR 30CC/hr    Monitor I/Os and electrolytes    ====================GASTROINTESTINAL===================  On clears, tolerating  No indication for GI prophylaxis   Continue Zofran / Reglan for nausea - PRN	    ========================HEMATOLOGIC/ONCOLOGIC====================  Monitor chest tube output. No signs of active bleeding.   Follow CBC in AM    ============================INFECTIOUS DISEASE========================  Monitor for fever / leukocytosis.  All surgical incision / chest tube  sites look clean      Pt is on GI & DVT prophylaxis  OOB & ambulate       Pertinent clinical, laboratory, radiographic, hemodynamic, echocardiographic, respiratory data, microbiologic data and chart were reviewed and analyzed frequently throughout the course of the day and night  Patient seen, examined and plan discussed with CT Surgery / CTICU team during rounds.    Pt's status discussed with family at bedside, updated status        Jenny Arellano DO FACEP    
Anesthesia Pain Management Service    SUBJECTIVE: Patient is doing well with IV PCA and no significant problems reported.    Pain Scale Score	At rest: _6/10__ 	With Activity: ___ 	[X ] Refer to charted pain scores    THERAPY:    [ ] IV PCA Morphine		[ ] 5 mg/mL	[ ] 1 mg/mL  [X ] IV PCA Hydromorphone	[ ] 5 mg/mL	[X ] 1 mg/mL  [ ] IV PCA Fentanyl		[ ] 50 micrograms/mL    Demand dose __0.2_ lockout __6_ (minutes) Continuous Rate _0__ Total: 6.5___   mg used (in past 24 hrs)      MEDICATIONS  (STANDING):  acetaminophen   IVPB .. 1000 milliGRAM(s) IV Intermittent every 6 hours  albuterol    0.083% 2.5 milliGRAM(s) Nebulizer every 6 hours  chlorhexidine 2% Cloths 1 Application(s) Topical daily  fluticasone propionate/ salmeterol 250-50 MICROgram(s) Diskus 1 Dose(s) Inhalation two times a day  heparin   Injectable 5000 Unit(s) SubCutaneous every 8 hours  lactated ringers. 1000 milliLiter(s) (30 mL/Hr) IV Continuous <Continuous>  lidocaine   4% Patch 1 Patch Transdermal daily  montelukast 10 milliGRAM(s) Oral daily  mupirocin 2% Nasal 1 Application(s) Both Nostrils two times a day  polyethylene glycol 3350 17 Gram(s) Oral daily  senna 2 Tablet(s) Oral at bedtime  sodium chloride 3%  Inhalation 4 milliLiter(s) Inhalation every 12 hours    MEDICATIONS  (PRN):  naloxone Injectable 0.1 milliGRAM(s) IV Push every 3 minutes PRN For ANY of the following changes in patient status:  A. RR LESS THAN 10 breaths per minute, B. Oxygen saturation LESS THAN 90%, C. Sedation score of 6  ondansetron Injectable 4 milliGRAM(s) IV Push every 6 hours PRN Nausea  oxyCODONE    IR 5 milliGRAM(s) Oral every 3 hours PRN Moderate Pain (4 - 6)  oxyCODONE    IR 10 milliGRAM(s) Oral every 3 hours PRN Severe Pain (7 - 10)      OBJECTIVE: Sitting up in chair. CTX1 in place.    Sedation Score:	[ X] Alert	[ ] Drowsy 	[ ] Arousable	[ ] Asleep	[ ] Unresponsive    Side Effects:	[X ] None	[ ] Nausea	[ ] Vomiting	[ ] Pruritus  		[ ] Other:    Vital Signs Last 24 Hrs  T(C): 37.2 (24 Jun 2025 08:00), Max: 37.2 (24 Jun 2025 08:00)  T(F): 98.9 (24 Jun 2025 08:00), Max: 98.9 (24 Jun 2025 08:00)  HR: 99 (24 Jun 2025 10:00) (68 - 99)  BP: 112/78 (24 Jun 2025 10:00) (101/63 - 147/83)  BP(mean): 88 (24 Jun 2025 10:00) (76 - 102)  RR: 16 (24 Jun 2025 10:00) (10 - 23)  SpO2: 92% (24 Jun 2025 10:00) (91% - 100%)    Parameters below as of 24 Jun 2025 11:00  Patient On (Oxygen Delivery Method): room air        ASSESSMENT/ PLAN    Therapy to  be:	[ ] Continue   [ X] Discontinued   [X ] Change to prn Analgesics    Documentation and Verification of current medications:   [X] Done	[ ] Not done, not elligible    Comments: IV PCA discontinued. PRN Oral/IV opioids and/or Adjuvant non-opioid medication to be ordered at this point.    Progress Note written now but Patient was seen earlier.
Subjective:   Patient seen and examined by thoracic surgery team.  Resting comfortably in bed on room air.  Pain is controlled. Denies CP, SOB. Reports some soreness around surgical sites that is worse with coughing.  OOB ad pura, using incentive spirometer.  Tolerating diet, +flatus  +FEAL observed on exam, CT transitioned to WS    Physical Exam:  General: WN/WD NAD  Neurology: Awake, nonfocal, GALVIN x 4  Eyes: Scleras clear, PERRLA/ EOMI, Gross vision intact  ENT:Gross hearing intact, grossly patent pharynx, no stridor  Neck: Neck supple, trachea midline, No JVD,   Respiratory: CTA B/L, No wheezing, rales, rhonchi  CV: RRR, S1S2, no murmurs, rubs or gallops  Abdominal: Soft, NT, ND +BS,   Extremities: No edema, + peripheral pulses  Skin: No Rashes, Hematoma, Ecchymosis  Lymphatic: No Neck, axilla, groin LAD  Psych: Oriented x 3, normal affect  Incisions: c/d/i  Tubes: R CT to WS, +FEAL, SS output 220cc/24hr    I&O's Summary    24 Jun 2025 07:01  -  25 Jun 2025 07:00  --------------------------------------------------------  IN: 1030 mL / OUT: 1270 mL / NET: -240 mL        Vitals:  Vital Signs Last 24 Hrs  T(C): 37 (25 Jun 2025 09:04), Max: 37.3 (24 Jun 2025 23:47)  T(F): 98.6 (25 Jun 2025 09:04), Max: 99.2 (24 Jun 2025 23:47)  HR: 102 (25 Jun 2025 09:04) (80 - 109)  BP: 128/63 (25 Jun 2025 09:04) (110/53 - 148/77)  BP(mean): 81 (24 Jun 2025 17:00) (71 - 84)  RR: 18 (25 Jun 2025 09:04) (12 - 28)  SpO2: 99% (25 Jun 2025 09:04) (91% - 100%)    Parameters below as of 25 Jun 2025 09:04  Patient On (Oxygen Delivery Method): room air        Labs:                        14.5   14.59 )-----------( 320      ( 25 Jun 2025 06:22 )             44.2     06-25    136  |  100  |  13  ----------------------------<  123[H]  4.0   |  20[L]  |  0.80    Ca    8.2[L]      25 Jun 2025 06:22  Phos  2.1     06-25  Mg     2.20     06-25          Medications:  MEDICATIONS  (STANDING):  albuterol    0.083% 2.5 milliGRAM(s) Nebulizer every 6 hours  chlorhexidine 2% Cloths 1 Application(s) Topical daily  fluticasone propionate/ salmeterol 250-50 MICROgram(s) Diskus 1 Dose(s) Inhalation two times a day  heparin   Injectable 5000 Unit(s) SubCutaneous every 8 hours  lidocaine   4% Patch 1 Patch Transdermal daily  montelukast 10 milliGRAM(s) Oral daily  mupirocin 2% Nasal 1 Application(s) Both Nostrils two times a day  polyethylene glycol 3350 17 Gram(s) Oral daily  senna 2 Tablet(s) Oral at bedtime  sodium chloride 3%  Inhalation 4 milliLiter(s) Inhalation every 12 hours    MEDICATIONS  (PRN):  naloxone Injectable 0.1 milliGRAM(s) IV Push every 3 minutes PRN For ANY of the following changes in patient status:  A. RR LESS THAN 10 breaths per minute, B. Oxygen saturation LESS THAN 90%, C. Sedation score of 6  ondansetron Injectable 4 milliGRAM(s) IV Push every 6 hours PRN Nausea  oxyCODONE    IR 5 milliGRAM(s) Oral every 3 hours PRN Moderate Pain (4 - 6)  oxyCODONE    IR 10 milliGRAM(s) Oral every 3 hours PRN Severe Pain (7 - 10)      Allergies:    shellfish (Short breath; Hives)  No Known Drug Allergies        PMHx:  Asthma      Emphysema of right lung      Nodule of right lung      Pectus excavatum      H/O arthroscopy of right knee      H/O arthroscopy of left knee      Assessment:  Patient is a 45y Male never smoker with PMHx of asthma since childhood with multiple hospitalizations and no intubations, pectus excavatum with CT chest finding of RUL lung nodule measuring 1cm. Pt is now s/p FB, Robotic RVATS, RUL wedge resection. Postoperative course significant for air leak.    Plan:  Neuro: Pain management continued.  Pulm: Encourage coughing, deep breathing and use of incentive spirometry. Daily CXR. Will obtain WS CXR and evaluate.  Cardio: Monitor telemetry/alarms.  GI: Tolerating diet. Continue stool softeners as needed.  Renal: monitor urine output, supplement electrolytes as needed.  Vasc: Heparin SC & SCDs for DVT prophylaxis  Heme: Stable H/H.  ID: Stable.  Therapy: OOB/ambulate ad pura.  Tubes: Monitor Chest tube output and record qshift. Continue to monitor air leak.  Disposition: Aim to D/C to home pending chest tube removal.  Discussed with Cardiothoracic Team at AM rounds.

## 2025-06-26 ENCOUNTER — TRANSCRIPTION ENCOUNTER (OUTPATIENT)
Age: 45
End: 2025-06-26

## 2025-06-26 VITALS
HEART RATE: 102 BPM | OXYGEN SATURATION: 99 % | TEMPERATURE: 98 F | RESPIRATION RATE: 18 BRPM | DIASTOLIC BLOOD PRESSURE: 67 MMHG | SYSTOLIC BLOOD PRESSURE: 131 MMHG

## 2025-06-26 LAB
ANION GAP SERPL CALC-SCNC: 13 MMOL/L — SIGNIFICANT CHANGE UP (ref 7–14)
BUN SERPL-MCNC: 10 MG/DL — SIGNIFICANT CHANGE UP (ref 7–23)
CALCIUM SERPL-MCNC: 8.3 MG/DL — LOW (ref 8.4–10.5)
CHLORIDE SERPL-SCNC: 97 MMOL/L — LOW (ref 98–107)
CO2 SERPL-SCNC: 26 MMOL/L — SIGNIFICANT CHANGE UP (ref 22–31)
CREAT SERPL-MCNC: 0.71 MG/DL — SIGNIFICANT CHANGE UP (ref 0.5–1.3)
EGFR: 114 ML/MIN/1.73M2 — SIGNIFICANT CHANGE UP
EGFR: 114 ML/MIN/1.73M2 — SIGNIFICANT CHANGE UP
GLUCOSE SERPL-MCNC: 118 MG/DL — HIGH (ref 70–99)
HCT VFR BLD CALC: 40 % — SIGNIFICANT CHANGE UP (ref 39–50)
HGB BLD-MCNC: 13.4 G/DL — SIGNIFICANT CHANGE UP (ref 13–17)
MAGNESIUM SERPL-MCNC: 1.9 MG/DL — SIGNIFICANT CHANGE UP (ref 1.6–2.6)
MCHC RBC-ENTMCNC: 27.7 PG — SIGNIFICANT CHANGE UP (ref 27–34)
MCHC RBC-ENTMCNC: 33.5 G/DL — SIGNIFICANT CHANGE UP (ref 32–36)
MCV RBC AUTO: 82.6 FL — SIGNIFICANT CHANGE UP (ref 80–100)
NRBC # BLD AUTO: 0 K/UL — SIGNIFICANT CHANGE UP (ref 0–0)
NRBC # FLD: 0 K/UL — SIGNIFICANT CHANGE UP (ref 0–0)
NRBC BLD AUTO-RTO: 0 /100 WBCS — SIGNIFICANT CHANGE UP (ref 0–0)
PHOSPHATE SERPL-MCNC: 2.3 MG/DL — LOW (ref 2.5–4.5)
PLATELET # BLD AUTO: 300 K/UL — SIGNIFICANT CHANGE UP (ref 150–400)
PMV BLD: 9.4 FL — SIGNIFICANT CHANGE UP (ref 7–13)
POTASSIUM SERPL-MCNC: 3.6 MMOL/L — SIGNIFICANT CHANGE UP (ref 3.5–5.3)
POTASSIUM SERPL-SCNC: 3.6 MMOL/L — SIGNIFICANT CHANGE UP (ref 3.5–5.3)
RBC # BLD: 4.84 M/UL — SIGNIFICANT CHANGE UP (ref 4.2–5.8)
RBC # FLD: 13.6 % — SIGNIFICANT CHANGE UP (ref 10.3–14.5)
SODIUM SERPL-SCNC: 136 MMOL/L — SIGNIFICANT CHANGE UP (ref 135–145)
WBC # BLD: 11.66 K/UL — HIGH (ref 3.8–10.5)
WBC # FLD AUTO: 11.66 K/UL — HIGH (ref 3.8–10.5)

## 2025-06-26 PROCEDURE — 71045 X-RAY EXAM CHEST 1 VIEW: CPT | Mod: 26,76

## 2025-06-26 RX ORDER — POLYETHYLENE GLYCOL 3350 17 G/17G
17 POWDER, FOR SOLUTION ORAL
Qty: 0 | Refills: 0 | DISCHARGE
Start: 2025-06-26

## 2025-06-26 RX ORDER — ACETAMINOPHEN 500 MG/5ML
650 LIQUID (ML) ORAL EVERY 6 HOURS
Refills: 0 | Status: DISCONTINUED | OUTPATIENT
Start: 2025-06-26 | End: 2025-06-26

## 2025-06-26 RX ORDER — OXYCODONE HYDROCHLORIDE 30 MG/1
1 TABLET ORAL
Qty: 30 | Refills: 0
Start: 2025-06-26 | End: 2025-06-30

## 2025-06-26 RX ORDER — ACETAMINOPHEN 500 MG/5ML
2 LIQUID (ML) ORAL
Qty: 0 | Refills: 0 | DISCHARGE
Start: 2025-06-26

## 2025-06-26 RX ORDER — SENNA 187 MG
2 TABLET ORAL
Qty: 0 | Refills: 0 | DISCHARGE
Start: 2025-06-26

## 2025-06-26 RX ADMIN — Medication 2.5 MILLIGRAM(S): at 04:02

## 2025-06-26 RX ADMIN — Medication 4 MILLILITER(S): at 10:24

## 2025-06-26 RX ADMIN — OXYCODONE HYDROCHLORIDE 10 MILLIGRAM(S): 30 TABLET ORAL at 06:58

## 2025-06-26 RX ADMIN — MONTELUKAST SODIUM 10 MILLIGRAM(S): 10 TABLET ORAL at 11:50

## 2025-06-26 RX ADMIN — Medication 650 MILLIGRAM(S): at 10:30

## 2025-06-26 RX ADMIN — LIDOCAINE HYDROCHLORIDE 1 PATCH: 20 JELLY TOPICAL at 11:52

## 2025-06-26 RX ADMIN — POLYETHYLENE GLYCOL 3350 17 GRAM(S): 17 POWDER, FOR SOLUTION ORAL at 11:50

## 2025-06-26 RX ADMIN — Medication 650 MILLIGRAM(S): at 09:53

## 2025-06-26 RX ADMIN — OXYCODONE HYDROCHLORIDE 10 MILLIGRAM(S): 30 TABLET ORAL at 05:58

## 2025-06-26 RX ADMIN — Medication 2.5 MILLIGRAM(S): at 10:24

## 2025-06-26 RX ADMIN — HEPARIN SODIUM 5000 UNIT(S): 1000 INJECTION INTRAVENOUS; SUBCUTANEOUS at 05:45

## 2025-06-26 RX ADMIN — Medication 1 APPLICATION(S): at 05:45

## 2025-06-26 NOTE — DISCHARGE NOTE PROVIDER - HOSPITAL COURSE
Patient is a 45 year old male never smoker with PMHx of asthma since childhood, pectus excavatum, but with worsening control of his symptoms since COVID, recently had abnormal CT findings of RUL 1 cm nodule. Now s/p FB, Robotic RVATS, RUL wedge resection on 6/23/25. Patient is a 45 year old male never smoker with PMHx of asthma since childhood, pectus excavatum, but with worsening control of his symptoms since COVID, recently had abnormal CT findings of RUL 1 cm nodule. Now s/p FB, Robotic RVATS, RUL wedge resection on 6/23/25. Postoperative course significant for air leak. On POD 3, air leak resolved and chest tube was removed without complication. Post removal chest x-ray stable. Pain is controlled, denies CP. Using incentive spirometer. Tolerating diet, +flatus, +BM. OOB and ambulating ad pura. Patient cleared for discharge to home by Dr Mclean.    PHYSICAL EXAM:  Gen: NAD  Resp: Respirations unlabored. Bilateral chest rise.   Card: RRR.   GI: Soft. Nontender. Nondistended.   Skin: Warm, well perfused, no masses or organomegaly  EXT: No clubbing, cyanosis, or edema  Incisions: s/p RVATS, dressing in place with 1 suture, c/d/i    Vital   Signs Last 24 Hrs  T(C): 37.1 (26 Jun 2025 08:45), Max: 37.3 (25 Jun 2025 16:29)  T(F): 98.8 (26 Jun 2025 08:45), Max: 99.2 (25 Jun 2025 16:29)  HR: 109 (26 Jun 2025 08:45) (100 - 110)  BP: 125/75 (26 Jun 2025 08:45) (125/75 - 147/83)  RR: 18 (26 Jun 2025 08:45) (18 - 18)  SpO2: 99% (26 Jun 2025 08:45) (96% - 100%)    Parameters below as of 26 Jun 2025 08:45  Patient On (Oxygen Delivery Method): room air

## 2025-06-26 NOTE — DISCHARGE NOTE NURSING/CASE MANAGEMENT/SOCIAL WORK - PATIENT PORTAL LINK FT
You can access the FollowMyHealth Patient Portal offered by [facility] by registering at the following website: [email or web address]. By joining [facility]’s FollowMyHealth portal, you will also be able to view your health information using other applications (apps) compatible with our system.

## 2025-06-26 NOTE — DISCHARGE NOTE PROVIDER - NSDCFUSCHEDAPPT_GEN_ALL_CORE_FT
Shalonda Garduno  [facility] Physician [facility]  PULMMED [address]  Scheduled Appointment: 07/16/2025    Chepe Mclean  [facility] Physician [facility]  THORSURG [id number]  Scheduled Appointment: 08/14/2025

## 2025-06-26 NOTE — DISCHARGE NOTE PROVIDER - NSDCCPCAREPLAN_GEN_ALL_CORE_FT
PRINCIPAL DISCHARGE DIAGNOSIS  Diagnosis: Nonspecific abnormal findings on radiological and other examination of lung field  Assessment and Plan of Treatment:

## 2025-06-26 NOTE — DISCHARGE NOTE NURSING/CASE MANAGEMENT/SOCIAL WORK - NSDCPEFALRISK_GEN_ALL_CORE
For information on Fall & Injury Prevention, visit: [email or web address] OR  [email or web address] OR  https://www.cdc.gov/steadi/patient.html

## 2025-06-26 NOTE — DISCHARGE NOTE PROVIDER - NSDCFUADDAPPT_GEN_ALL_CORE_FT
- Please follow up with Dr Mclean in 2 weeks.   - Call the outpatient Thoracic Surgery Office at [phone] to make your appointment.   - Obtain a Chest X-Ray 1-2 days prior to your appointment and have your results/images sent to the office or bring them to your appointment. A script has been provided to you upon discharge.   - Follow up with your primary care provider after discharge as needed.

## 2025-06-26 NOTE — DISCHARGE NOTE NURSING/CASE MANAGEMENT/SOCIAL WORK - FINANCIAL ASSISTANCE
[facility] provides services at a reduced cost to those who are determined to be eligible through [facility]’s financial assistance program. Information regarding [facility]’s financial assistance program can be found by going to [email or web address] or by calling [phone].

## 2025-06-26 NOTE — DISCHARGE NOTE PROVIDER - CARE PROVIDER_API CALL
Chepe Mclean  Thoracic and Cardiac Surgery  [address] [place] [address], [address] [place] [address]  Phone: [phone]  Fax: [phone]  Follow Up Time:

## 2025-06-26 NOTE — DISCHARGE NOTE PROVIDER - NSDCACTIVITY_GEN_ALL_CORE
Sex allowed/Do not drive or operate machinery/Showering allowed/Stairs allowed/Walking - Indoors allowed/No heavy lifting/straining/Walking - Outdoors allowed/Follow Instructions Provided by your Surgical Team/Activity as tolerated

## 2025-06-26 NOTE — DISCHARGE NOTE PROVIDER - NSDCMRMEDTOKEN_GEN_ALL_CORE_FT
Breztri Aerosphere 160 mcg-9 mcg-4.8 mcg/inh inhalation aerosol: 2 puff(s) inhaled 2 times a day  montelukast 10 mg oral tablet: 1 tab(s) orally once a day  Tezspire Pre-filled Syringe 210 mg/1.91 mL subcutaneous solution: 210 milligram(s) subcutaneously every 4 weeks   acetaminophen 325 mg oral tablet: 2 tab(s) orally every 6 hours As needed Mild Pain (1 - 3)  Breztri Aerosphere 160 mcg-9 mcg-4.8 mcg/inh inhalation aerosol: 2 puff(s) inhaled 2 times a day  montelukast 10 mg oral tablet: 1 tab(s) orally once a day  polyethylene glycol 3350 oral powder for reconstitution: 17 gram(s) orally once a day  senna leaf extract oral tablet: 2 tab(s) orally once a day (at bedtime)  Tezspire Pre-filled Syringe 210 mg/1.91 mL subcutaneous solution: 210 milligram(s) subcutaneously every 4 weeks   acetaminophen 325 mg oral tablet: 2 tab(s) orally every 6 hours As needed Mild Pain (1 - 3)  Breztri Aerosphere 160 mcg-9 mcg-4.8 mcg/inh inhalation aerosol: 2 puff(s) inhaled 2 times a day  montelukast 10 mg oral tablet: 1 tab(s) orally once a day  oxyCODONE 5 mg oral tablet: 1 tab(s) orally every 4 hours as needed for  severe pain Take half a tablet as needed for moderate pain MDD: 6  PA/lateral chest x-ray: Please obtain prior to follow-up appointment  polyethylene glycol 3350 oral powder for reconstitution: 17 gram(s) orally once a day  senna leaf extract oral tablet: 2 tab(s) orally once a day (at bedtime)  Tezspire Pre-filled Syringe 210 mg/1.91 mL subcutaneous solution: 210 milligram(s) subcutaneously every 4 weeks

## 2025-06-26 NOTE — DISCHARGE NOTE PROVIDER - NSDCFUADDINST_GEN_ALL_CORE_FT
You may remove all bandages tonight and then you may take a shower. Clean all incisions daily while showering with warm water and mild soap, pat dry with a clean towel. Leave site open to air as much as possible.      The suture will be removed when you return to the office.     No bathing, swimming in a pool or the ocean until instructed by MD. Do not use creams or lotions on the wound.     Continue with daily ambulation and use of incentive spirometer. You may climb stairs and go outside as tolerated.     Take prescribed pain medications as needed.     Do not lift heavy objects, operate machinery, drive or return to school/work until you are cleared by your surgeon at your follow up appointment.     Call the office if you experience any fevers, shortness of breath, chest pain, palpitations, excessive or purulent drainage from the incision site, persistent nausea, vomiting, leg swelling day or night. Call 911 or go to the nearest emergency room if any of these symptoms are severe.

## 2025-06-26 NOTE — DISCHARGE NOTE NURSING/CASE MANAGEMENT/SOCIAL WORK - NSDCFUADDAPPT_GEN_ALL_CORE_FT
- Please follow up with Dr Mclean in 2 weeks.   - Call the outpatient Thoracic Surgery Office at [phone] to make your appointment.   - Obtain a Chest X-Ray 1-2 days prior to your appointment and have your results/images sent to the office or bring them to your appointment. A script has been provided to you upon discharge.   - Follow up with your primary care provider after discharge as needed.

## 2025-06-30 PROBLEM — J45.909 UNSPECIFIED ASTHMA, UNCOMPLICATED: Chronic | Status: ACTIVE | Noted: 2025-06-17

## 2025-06-30 PROBLEM — Q67.6 PECTUS EXCAVATUM: Chronic | Status: ACTIVE | Noted: 2025-06-17

## 2025-06-30 PROBLEM — J43.9 EMPHYSEMA, UNSPECIFIED: Chronic | Status: ACTIVE | Noted: 2025-06-17

## 2025-06-30 PROBLEM — R91.1 SOLITARY PULMONARY NODULE: Chronic | Status: ACTIVE | Noted: 2025-06-17

## 2025-07-02 LAB — SURGICAL PATHOLOGY STUDY: SIGNIFICANT CHANGE UP

## 2025-07-02 NOTE — CONSULT LETTER
[Dear  ___] : Dear  [unfilled], [Consult Letter:] : I had the pleasure of evaluating your patient, [unfilled]. [Please see my note below.] : Please see my note below. [Consult Closing:] : Thank you very much for allowing me to participate in the care of this patient.  If you have any questions, please do not hesitate to contact me. [Sincerely,] : Sincerely, [FreeTextEntry2] : Codi García MD (ref/pulm) [FreeTextEntry3] : Jian Domínguez MD, MPH System Director of Thoracic Surgery Director of Comprehensive Lung and Foregut [place] Professor Cardiovascular & Thoracic Surgery  [facility] [employer] at [facility]

## 2025-07-02 NOTE — ASSESSMENT
[FreeTextEntry1] : Mr. GALDINO JEFFERY, 45 year old male, never smoker, w/ hx of asthma since childhood, pectus excavatum, but with worsening control of his symptoms since COVID, who presented with abnormal CT findings. Referred by Dr. García.  CT Chest on 10/15/24: - Right middle lobe 5 mm nodule (3 1, 81). - Right upper lobe partially peripherally calcified 1 cm nodule (301, 53).  PFTs on 24: FVC 50%, FEV1 46%, DLCO 67%  24: this was his very first CT scan, the 1 cm RUL nodule is partially calcified, I recommended a close surveillance, and patient to RTC in 3 months with repeat CT Chest w/o contrast.  CT Chest 25 NW: - noted the anterior/apical segments of right upper lobe and medial segment of right middle lobe are hyperlucent with diminished vascularity and emphysematous changes. - Foci of bronchiectasis within these affected segments. Stable partially calcified right upper lobe nodule. - Stable 5 mm right middle lobe nodule. - Pectus excavatum deformity  3/4/25: Per Dr. García, would like patient to be evaluated for surgical resection of affected RUL lobe (given 1cm nodule, resection may be a better option than EBV)  Quantitative VQ scan on 25:  RIGHT lun%;  LEFT lun% RIGHT upper lun%        LEFT upper lun% RIGHT mid lun%        LEFT mid lun% RIGHT lower lun%      LEFT lower lun%  Now s/p FB, aspirate secretions, Rt VATs, R.A., RUL emphysematous disease wedge rxn, extra 1 hr of intraoperative dissection secondary to difficult procedure on 25. Path revealed:   Pt presents today for follow up.  I have reviewed the patient's medical records and diagnostic images at time of this office consultation and have made the following recommendation: 1.

## 2025-07-16 ENCOUNTER — APPOINTMENT (OUTPATIENT)
Dept: PULMONOLOGY | Facility: CLINIC | Age: 45
End: 2025-07-16
Payer: COMMERCIAL

## 2025-07-16 VITALS
OXYGEN SATURATION: 97 % | BODY MASS INDEX: 32.37 KG/M2 | HEIGHT: 69 IN | TEMPERATURE: 98.5 F | SYSTOLIC BLOOD PRESSURE: 119 MMHG | RESPIRATION RATE: 18 BRPM | DIASTOLIC BLOOD PRESSURE: 80 MMHG | WEIGHT: 218.56 LBS | HEART RATE: 101 BPM

## 2025-07-16 PROBLEM — R07.81 RIB PAIN ON RIGHT SIDE: Status: ACTIVE | Noted: 2025-07-16

## 2025-07-16 PROCEDURE — G2211 COMPLEX E/M VISIT ADD ON: CPT

## 2025-07-16 PROCEDURE — 99214 OFFICE O/P EST MOD 30 MIN: CPT

## 2025-07-16 RX ORDER — ACETAMINOPHEN AND CODEINE PHOSPHATE 300; 15 MG/1; MG/1
300-15 TABLET ORAL
Qty: 30 | Refills: 0 | Status: ACTIVE | COMMUNITY
Start: 2025-07-16 | End: 1900-01-01

## 2025-07-16 NOTE — HISTORY OF PRESENT ILLNESS
[Never] : never [TextBox_4] : Mr. GALDINO JEFFERY is a 43 year old man with longstanding history of asthma (multiple hospitalizations, no intubations, previously on Dupixent) is here for follow-up.  History Reports hx of asthma since childhood. Was hospitalized with post COVID/asthma in 2020, total 3 asthma related hospitalizations, no intubations. He has been maintained on Breztri and prn albuterol. He notes worsening control of his sx since COVID.  He requires frequent courses of prednisone, 5-6 courses since Jan. he always feels better after steroids had  Flu A June 2023, treated with tamiflu. Ever since, continued to have mild dry cough, wheezing more than normal. Was previusly followed by Dr Magdaleno [phone].  Was on Dupixent last year for about 8 months without improvement in sx.   Interval events: 3/2025: On Tezspire Nov 2024.  Continues to have good and bad days. Wheezing daily, some days worse than others.  Uses Albuterol multiple times a week.  Evaluated by Dr Domínguez.   6/2025: Scheduled for Right VATS, Right Upper Lobectomy w Dr Domínguez for 6/23. Notes worsening respiratory sx, more SOB and wheezing. 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  Continue to require albuterol frequently.   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   Path revealed lung tissue with area of fibroelastotic scar. Lung parenchyma with airway dilatation with mucus accumulation and emphysema.  Continues to have significant pain at the site.  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

## 2025-07-16 NOTE — ASSESSMENT
[FreeTextEntry1] : Mr. GALDINO JEFFERY is a 43 year old man with longstanding history of asthma (multiple hospitalizations, no intubations, previously on Dupixent) is here for f/u.  #Focal Air trapping/emphysema/RUL bronchial atrasia PFTs (PLETH) with e/o air trapping, large RV (>200%), decreased DLCO.  A1AT levels normal. Patient is a never smoker, no significant exposures.  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 Path revealed lung tissue with area of fibroelastotic scar. Lung parenchyma with airway dilatation with mucus accumulation and emphysema.   #Lung nodules CT Chest 02/04/25 NW: -  Stable partially calcified right upper lobe nodule (1cm) -s/p resecton - Stable 5 mm right middle lobe nodule.  #Asthma - Reports dx in childhood, required several hospitalizaitons in the past, frequent steroid use. Not well controlled, s/p prolonged steroid taper August 2023, another course of steroids December 2023 frequent albuterol use.   Blood work with multiple allergies abd eosinophilia PFTs with severe obstruction and positive BD response.  Previously on Dupixent x 6 mo which per patient did not help On Nucala since 3/2024.  w no improvmeent Tezspire - since Nov 2024.    -- Continue Breztri + Puilmicort and albuterol prn -- c/w Singulair and levocetirizine for allergies -- c/w Tezspire for now  All questions answered. Patient in agreement with plan. Follow up in 2-3mo or sooner if needed.  
The patient is a 25y Male complaining of

## 2025-07-16 NOTE — PHYSICAL EXAM
[No Acute Distress] : no acute distress [Normal Oropharynx] : normal oropharynx [Normal Appearance] : normal appearance [No Neck Mass] : no neck mass [Normal Rate/Rhythm] : normal rate/rhythm [Normal S1, S2] : normal s1, s2 [No Murmurs] : no murmurs [No Resp Distress] : no resp distress [No Abnormalities] : no abnormalities [Benign] : benign [Normal Gait] : normal gait [No Clubbing] : no clubbing [No Cyanosis] : no cyanosis [No Edema] : no edema [FROM] : FROM [Normal Color/ Pigmentation] : normal color/ pigmentation [No Focal Deficits] : no focal deficits [Oriented x3] : oriented x3 [Normal Affect] : normal affect [TextBox_68] : Clear lungs

## 2025-07-16 NOTE — CONSULT LETTER
[Dear  ___] : Dear  [unfilled], [Consult Letter:] : I had the pleasure of evaluating your patient, [unfilled]. [Please see my note below.] : Please see my note below. [Consult Closing:] : Thank you very much for allowing me to participate in the care of this patient.  If you have any questions, please do not hesitate to contact me. [FreeTextEntry3] : Sincerely,  Codi García MD [facility] tel: [phone] fax: [phone]

## 2025-07-17 ENCOUNTER — APPOINTMENT (OUTPATIENT)
Dept: RADIOLOGY | Facility: HOSPITAL | Age: 45
End: 2025-07-17

## 2025-07-17 ENCOUNTER — OUTPATIENT (OUTPATIENT)
Dept: OUTPATIENT SERVICES | Facility: HOSPITAL | Age: 45
LOS: 1 days | End: 2025-07-17
Payer: COMMERCIAL

## 2025-07-17 ENCOUNTER — NON-APPOINTMENT (OUTPATIENT)
Age: 45
End: 2025-07-17

## 2025-07-17 ENCOUNTER — RESULT REVIEW (OUTPATIENT)
Age: 45
End: 2025-07-17

## 2025-07-17 ENCOUNTER — APPOINTMENT (OUTPATIENT)
Dept: THORACIC SURGERY | Facility: CLINIC | Age: 45
End: 2025-07-17
Payer: COMMERCIAL

## 2025-07-17 VITALS
BODY MASS INDEX: 32.58 KG/M2 | OXYGEN SATURATION: 99 % | DIASTOLIC BLOOD PRESSURE: 88 MMHG | WEIGHT: 220 LBS | HEART RATE: 100 BPM | SYSTOLIC BLOOD PRESSURE: 130 MMHG | RESPIRATION RATE: 17 BRPM | HEIGHT: 69 IN

## 2025-07-17 DIAGNOSIS — J90 PLEURAL EFFUSION, NOT ELSEWHERE CLASSIFIED: ICD-10-CM

## 2025-07-17 DIAGNOSIS — Z98.890 OTHER SPECIFIED POSTPROCEDURAL STATES: Chronic | ICD-10-CM

## 2025-07-17 PROBLEM — G89.18 POST-OP PAIN: Status: ACTIVE | Noted: 2025-07-17

## 2025-07-17 PROCEDURE — 99024 POSTOP FOLLOW-UP VISIT: CPT

## 2025-07-17 PROCEDURE — 71046 X-RAY EXAM CHEST 2 VIEWS: CPT | Mod: 26

## 2025-07-17 RX ORDER — GABAPENTIN 100 MG/1
100 CAPSULE ORAL
Qty: 63 | Refills: 0 | Status: ACTIVE | COMMUNITY
Start: 2025-07-17 | End: 1900-01-01

## 2025-07-17 NOTE — ASSESSMENT
[FreeTextEntry1] : Mr. GALDINO JEFFERY, 45 year old male, never smoker, w/ hx of asthma since childhood, pectus excavatum, but with worsening control of his symptoms since COVID, who presented with abnormal CT findings. Referred by Dr. García.  CT Chest on 10/15/24: - Right middle lobe 5 mm nodule (3 1, 81). - Right upper lobe partially peripherally calcified 1 cm nodule (301, 53).  PFTs on 24: FVC 50%, FEV1 46%, DLCO 67%  24: this was his very first CT scan, the 1 cm RUL nodule is partially calcified, I recommended a close surveillance, and patient to RTC in 3 months with repeat CT Chest w/o contrast.  CT Chest 25 NW: - noted the anterior/apical segments of right upper lobe and medial segment of right middle lobe are hyperlucent with diminished vascularity and emphysematous changes. - Foci of bronchiectasis within these affected segments. Stable partially calcified right upper lobe nodule. - Stable 5 mm right middle lobe nodule. - Pectus excavatum deformity  3/4/25: Per Dr. García, would like patient to be evaluated for surgical resection of affected RUL lobe (given 1cm nodule, resection may be a better option than EBV)  Quantitative VQ scan on 25:  RIGHT lun%;  LEFT lun% RIGHT upper lun%        LEFT upper lun% RIGHT mid lun%        LEFT mid lun% RIGHT lower lun%      LEFT lower lun%  Now s/p FB, aspirate secretions, Rt VATS, R.A., RUL emphysematous disease wedge rxn, extra 1 hr of intraoperative dissection secondary to difficult procedure on 25. Path revealed lung tissue with area of fibroelastotic scar. Lung parenchyma with airway dilatation with mucus accumulation and emphysema.  Elastic stain is positive within the area of scarring.    CXR today- Post op changes   Patient presents today for post op evaluation. + Moderate pain to surgical site w/ some burning sensation radiating anteriorly. Patient reports he felt too dizzy with Oxy and was subsequently prescribed Tylenol with Codeine w/ some resolve.   I have reviewed the patient's medical records and diagnostic images at time of this office consultation and have made the following recommendation: 1.Patient is overall recovering well; CXR today with post op findings; Path reviewed. He has already followed up with pulmonology. Recommend repeating a full set of pulmonary function testing in 6 months. Instructed to return to clinic 1-2 weeks following to discuss results and further plan of care.  2. Patient complaining of post op pain. Suspect some element of it to be neuropathic. Recommend a trial of Gabapentin for symptom improvement. Instructed to start with 100mg daily x 7 days followed by 200 mg daily x 7 days, then 300mg daily. Side effects reviewed; Patient understands. Instructed to contact office when ready to wean off.   Recommendations reviewed with patient during this office visit, and all questions answered; Patient instructed on the importance of follow up and verbalizes understanding.  I, MARVIN Romero, personally performed the evaluation and management (E/M) services for this established patient. That E/M includes conducting the examination, assessing all new/exacerbated conditions, and establishing a new plan of care. Today, My ACP, Dyana Durand, was here to observe my evaluation and management services for this patient to be followed going forward.

## 2025-07-17 NOTE — DISCUSSION/SUMMARY
[Doing Well] : is doing well [Excellent Pain Control] : has excellent pain control [No Sign of Infection] : is showing no signs of infection [Remove Sutures/Staples] : removed sutures/staples [Clinical Recheck] : clinical recheck [5] : 5

## 2025-07-17 NOTE — CONSULT LETTER
[FreeTextEntry2] : Codi García MD (ref/pulm) [FreeTextEntry3] : Jian Domínguez MD, MPH System Director of Thoracic Surgery Director of Comprehensive Lung and Foregut [place] Professor Cardiovascular & Thoracic Surgery  [facility] [employer] at [facility]

## 2025-07-17 NOTE — REASON FOR VISIT
[de-identified] : FB, Rt VATS RJUDSON, wedge rxn of RUL, extra 1 hour of intraoperative dissection secondary to difficulty of procedure. [de-identified] : 6/23/25

## 2025-07-17 NOTE — REASON FOR VISIT
[de-identified] : FB, Rt VATS RJUDSON, wedge rxn of RUL, extra 1 hour of intraoperative dissection secondary to difficulty of procedure. [de-identified] : 6/23/25

## 2025-07-17 NOTE — CONSULT LETTER
[FreeTextEntry2] : Codi García MD (ref/pulm) [FreeTextEntry3] : Jian Domínguez MD, MPH System Director of Thoracic Surgery Director of Comprehensive Lung and Foregut [place] Professor Cardiovascular & Thoracic Surgery  [facility] [employer] at [facility]

## 2025-08-15 ENCOUNTER — APPOINTMENT (OUTPATIENT)
Dept: CT IMAGING | Facility: CLINIC | Age: 45
End: 2025-08-15
Payer: COMMERCIAL

## 2025-08-15 PROCEDURE — 71250 CT THORAX DX C-: CPT

## 2025-08-21 ENCOUNTER — NON-APPOINTMENT (OUTPATIENT)
Age: 45
End: 2025-08-21

## 2025-08-21 ENCOUNTER — APPOINTMENT (OUTPATIENT)
Dept: THORACIC SURGERY | Facility: CLINIC | Age: 45
End: 2025-08-21

## 2025-08-21 VITALS
OXYGEN SATURATION: 98 % | SYSTOLIC BLOOD PRESSURE: 131 MMHG | DIASTOLIC BLOOD PRESSURE: 71 MMHG | WEIGHT: 215 LBS | HEART RATE: 51 BPM | BODY MASS INDEX: 31.84 KG/M2 | HEIGHT: 69 IN

## 2025-08-21 DIAGNOSIS — R91.8 OTHER NONSPECIFIC ABNORMAL FINDING OF LUNG FIELD: ICD-10-CM

## 2025-08-21 PROCEDURE — 99213 OFFICE O/P EST LOW 20 MIN: CPT | Mod: 24

## 2025-08-21 RX ORDER — GABAPENTIN 100 MG/1
100 CAPSULE ORAL
Qty: 60 | Refills: 1 | Status: ACTIVE | COMMUNITY
Start: 2025-08-21 | End: 1900-01-01

## 2025-08-22 RX ORDER — GABAPENTIN 100 MG/1
100 CAPSULE ORAL
Qty: 60 | Refills: 1 | Status: ACTIVE | COMMUNITY
Start: 2025-08-22 | End: 1900-01-01

## 2025-09-05 ENCOUNTER — APPOINTMENT (OUTPATIENT)
Dept: PULMONOLOGY | Facility: CLINIC | Age: 45
End: 2025-09-05
Payer: COMMERCIAL

## 2025-09-05 ENCOUNTER — APPOINTMENT (OUTPATIENT)
Dept: RADIOLOGY | Facility: CLINIC | Age: 45
End: 2025-09-05
Payer: COMMERCIAL

## 2025-09-05 VITALS
HEART RATE: 98 BPM | OXYGEN SATURATION: 97 % | SYSTOLIC BLOOD PRESSURE: 137 MMHG | HEIGHT: 69 IN | WEIGHT: 218 LBS | BODY MASS INDEX: 32.29 KG/M2 | DIASTOLIC BLOOD PRESSURE: 84 MMHG

## 2025-09-05 DIAGNOSIS — J45.909 UNSPECIFIED ASTHMA, UNCOMPLICATED: ICD-10-CM

## 2025-09-05 DIAGNOSIS — J47.9 BRONCHIECTASIS, UNCOMPLICATED: ICD-10-CM

## 2025-09-05 DIAGNOSIS — J43.9 EMPHYSEMA, UNSPECIFIED: ICD-10-CM

## 2025-09-05 PROCEDURE — 94729 DIFFUSING CAPACITY: CPT

## 2025-09-05 PROCEDURE — 99214 OFFICE O/P EST MOD 30 MIN: CPT | Mod: 25

## 2025-09-05 PROCEDURE — 94726 PLETHYSMOGRAPHY LUNG VOLUMES: CPT

## 2025-09-05 PROCEDURE — 71046 X-RAY EXAM CHEST 2 VIEWS: CPT

## 2025-09-05 PROCEDURE — 94010 BREATHING CAPACITY TEST: CPT

## 2025-09-05 RX ORDER — PREDNISONE 10 MG/1
10 TABLET ORAL
Qty: 30 | Refills: 0 | Status: ACTIVE | COMMUNITY
Start: 2025-09-05 | End: 1900-01-01

## 2025-09-09 ENCOUNTER — APPOINTMENT (OUTPATIENT)
Dept: PHYSICAL MEDICINE AND REHAB | Facility: CLINIC | Age: 45
End: 2025-09-09
Payer: COMMERCIAL

## 2025-09-09 VITALS
HEIGHT: 69 IN | BODY MASS INDEX: 32.29 KG/M2 | SYSTOLIC BLOOD PRESSURE: 145 MMHG | DIASTOLIC BLOOD PRESSURE: 85 MMHG | RESPIRATION RATE: 14 BRPM | HEART RATE: 83 BPM | WEIGHT: 218 LBS

## 2025-09-09 PROCEDURE — G2211 COMPLEX E/M VISIT ADD ON: CPT

## 2025-09-09 PROCEDURE — 99204 OFFICE O/P NEW MOD 45 MIN: CPT

## 2025-09-17 ENCOUNTER — NON-APPOINTMENT (OUTPATIENT)
Age: 45
End: 2025-09-17

## (undated) DEVICE — ELECTRO LUBE ANTI-STICK SOLUTION FOR CAUTERY TIP

## (undated) DEVICE — XI DRAPE COLUMN

## (undated) DEVICE — ENDOCATCH GENERAL 15MM (PURPLE)

## (undated) DEVICE — CHEST DRAIN PLEUR-EVAC DRY/WET ADULT-PEDS SINGLE (QUICK)

## (undated) DEVICE — D HELP - CLEARVIEW CLEARIFY SYSTEM

## (undated) DEVICE — ELCTR BOVIE TIP BLADE INSULATED 6.5" EDGE

## (undated) DEVICE — SUT VICRYL 0 27" UR-6

## (undated) DEVICE — BLADE SURGICAL #10 STAINLESS

## (undated) DEVICE — DRSG TEGADERM 4 X 4.75"

## (undated) DEVICE — SUT SILK 2-0 30" SH

## (undated) DEVICE — DRSG CURITY GAUZE SPONGE 4 X 4" 12-PLY

## (undated) DEVICE — TROCAR COVIDIEN VERSASTEP PLUS 15MM STANDARD

## (undated) DEVICE — ELCTR BOVIE TIP BLADE INSULATED 2.75" EDGE

## (undated) DEVICE — VENODYNE/SCD SLEEVE CALF MEDIUM

## (undated) DEVICE — GRASPER LAPA 5MMX35CM

## (undated) DEVICE — XI OBTURATOR OPTICAL BLADELESS 8MM

## (undated) DEVICE — XI DRAPE ARM

## (undated) DEVICE — NDL HYPO SAFE 22G X 1.5" (BLACK)

## (undated) DEVICE — TUBING STRYKEFLOW II SUCTION / IRRIGATOR

## (undated) DEVICE — SUT PROLENE 0 30" CT-1

## (undated) DEVICE — XI CORD BIPOLAR CAUTERY (BLUE)

## (undated) DEVICE — MARKING PEN W RULER

## (undated) DEVICE — SOL IRR POUR NS 0.9% 500ML

## (undated) DEVICE — PACK ROBOTIC

## (undated) DEVICE — ENDOCATCH GENERAL 10MM (PURPLE)

## (undated) DEVICE — XI ARM FORCEP FENESTRATED BIPOLAR 8MM

## (undated) DEVICE — XI SEAL UNIVERSIAL 5-12MM

## (undated) DEVICE — XI ARM CLIP APPLIER MEDIUM-LARGE

## (undated) DEVICE — TUBING OLYMPUS INSUFFLATION

## (undated) DEVICE — TROCAR COVIDIEN VERSAONE BLADELESS FIXATION 12MM STANDARD

## (undated) DEVICE — DRSG MASTISOL

## (undated) DEVICE — ELCTR BOVIE PENCIL SMOKE EVACUATION

## (undated) DEVICE — DRSG GAUZE PACKTNER ROLL

## (undated) DEVICE — GOWN XL

## (undated) DEVICE — SYR LUER LOK 20CC